# Patient Record
Sex: FEMALE | URBAN - METROPOLITAN AREA
[De-identification: names, ages, dates, MRNs, and addresses within clinical notes are randomized per-mention and may not be internally consistent; named-entity substitution may affect disease eponyms.]

---

## 2022-07-22 NOTE — PROGRESS NOTES
Tony Ferrell MD   Bariatric & Advanced Laparoscopic Surgery & Endoscopy  [facility] [address] [facility]  [place]                              Office [phone] Fax [phone]        Date of visit: 7/27/2022      History and Physical    Patient: Jamie Bhagat MRN: [id number]     YOB: 1995  Age: 27 y.o. Sex: female              PCP: No primary care provider on file. Date:  7/27/2022      Jamie Bhagat  who presents to the [facility] for consideration of bariatric surgery. This is her initial consultation preparing her for our multi-disciplinary surgical preparatory regimen. She presents with a height of 5' 7\" (1.702 m) and weight of 259 lb (117.5 kg), giving her a Body mass index is 40.57 kg/m². She has an ideal body weight of 159 lbs, and excess body weight of 100 lbs. BARIATRIC PROCEDURE OF INTEREST: laparoscopic sleeve gastrectomy    30 day Bariatric Surgical Risk Percentage: 3.87, 8.27%    MEDICAL HISTORY:  Morbid Obesity  Anxiety  Hyperthyroidism  Grave's Disease - well controlled - Dr. Marii José, [place]    Comorbidity Yes or No   Hypertension No   Hyperlipidemia No   Diabetes Mellitus No   Coronary Artery Disease No   Gastroesophageal Reflux No   Obstructive Sleep Apnea No   Cancer No   Asthma No   Osteoarthritis No   Joint Pain Yes     Admits to rare GERD symptoms including heartburn with certain foods. Denies medication, previous EGD, or dysphagia. Other Yes or No   Venous Stasis No   Dialysis No   Long term use of steroid or immunocompromised conditions No   On Anticoagulants No       DENTAL/CHEWING ABILITY:  No dental issues with chewing. PRIOR WEIGHT LOSS ATTEMPTS:  Reports more than 15 previous weight loss attempts including Weight Watchers, Atkins, Herbalife, and low calorie dieting with a nutritionist.     EXERCISE ASSESSMENT:  Reports exercise including walking / jogging 3 days per week. Reviewed NIH guidelines. PSYCHOSOCIAL:  She notes she is single and states main support person is her mother, Destiny Palacio. She is employed as a  at [employer]. Her goal in pursuing surgical weight loss is to improve health. She denies any active psychological problems and reports appropriate treatment of anxiety. She states she is independent in her care, can drive a car, and can ambulate without assistance. HISTORY:  Past Medical History:   Diagnosis Date    Graves disease        She denies personal or family history of problems with anesthesia, bleeding, or clotting. She denies history of DVT or pulmonary embolism. She denies dysphagia. Past Surgical History:   Procedure Laterality Date    MEDIAL COLLATERAL LIGAMENT REPAIR, KNEE Right 12/07/2007        Family History   Problem Relation Age of Onset    Diabetes Maternal Grandmother         MEDICATIONS:  No current outpatient medications on file. No current facility-administered medications for this visit. ALLERGIES:  Not on File    ROS: The patient has no difficulty with chest pain or shortness of breath. No fever or chills. Comprehensive 13 point review of systems was otherwise unremarkable except as noted above. Physical Exam:     /85   Pulse 85   Ht 5' 7\" (1.702 m)   Wt 259 lb (117.5 kg)   BMI 40.57 kg/m²     General:  Well-developed, well-nourished, no distress. Psych:  Cooperative, good insight and judgement. Neuro:  Alert, oriented to person, place and time. HEENT:  Normocephalic, atraumatic. Sclera clear. Lungs:  Unlabored breathing. Symmetrical chest expansion. Chest wall:  No tenderness or deformity. Heart:  Regular rate and rhythm. No JVD. Abdomen:  Soft, non-tender, non-distended. No guarding or rebound. No palpable masses. Extremities:  Extremities normal, atraumatic, no cyanosis or edema. Skin:  Skin color, texture, turgor normal. No rashes.     ASSESSMENT:  Morbid obesity with a Body mass index is 40.57 kg/m². beginning multi-disciplinary surgical prep program.    PLAN:  We have discussed the patient's participation and reviewed the steps in our preparatory program. She has had a long history of failed diet and exercise programs and has good understanding about the risks, benefits, and commitment related to bariatric surgery. She has attended our comprehensive educational seminar. She is interested in proceeding with our program seeking the laparoscopic sleeve gastrectomy. Diagnosis Orders   1. Morbid obesity ([address]        2. Obesity, Class III, BMI 40-49.9 (morbid obesity) ([address]        3. Hx of gastroesophageal reflux (GERD)        4. Encounter for pre-operative laboratory testing        5. Hyperthyroidism        6. Graves disease            1. Discussed in detail the Laparoscopic Beau-en-Y Gastric Bypass and the Laparoscopic Sleeve Gastrectomy including the benefits, risks, and complications of each operation. The patient has a clear understanding of all operations. Also, discussed perioperative care and in-hospital and home care after each operation. The patient verbalized and demonstrated a clear understanding of what to expect. 2.  Patient is an excellent candidate with a clear medical necessity for bariatric surgery. 3.  Encouraged patient to participate in our Bariatric Support Group. 4.  Advised that weight loss surgery is only one part of a lifelong weight management program, and that sustainable weight loss will only come with major diet and behavioral changes. Advised that weight loss surgery requires a commitment to self-management and long-term follow up. 5.  Nutrition Recommendations:   Diet Rx - Low-moderate calorie intake (~1637-2410 kcal/day, based on 14-16kcal/kg ABW). Work on eating at least 3x/d with lean protein focus.  2 week pre-operative diet with caloric restriction of ~1200kcal/d.     6.  Exercise Recommendations: Exercise routine, incorporating both cardio and strength training, building up to 5x/wk for at least 30 minutes. Physical therapy evaluation for guidance on exercise routine. 7.  The assessment and plan of care were reviewed in detail with the patient and her questions were answered. She will complete the following steps:  1. Complete bariatric labs after dietitian evaluation. 2.  Participation in our behavior modification program, reduced calorie diet program, and participation in our exercise program recommendations supervised by our office. 3.  Complete our required psychological evaluation. 4.  Reminded of policy of no weight gain during prep period. 5.  Upper GI ordered  6. Physical Therapy Evaluation      She will return after the above are complete for assessment of her progress and schedule surgery. Time: I spent 60 minutes preparing to see patient (including chart review and preparation), obtaining and/or reviewing additional medical history, performing a physical exam and evaluation, documenting clinical information in the electronic health record, independently interpreting results, communicating results to patient, family or caregiver, and/or coordinating care.         Signed: Bong Graves MD  Bariatric & Minimally Invasive Surgery  7/27/2022

## 2022-07-27 ENCOUNTER — OFFICE VISIT (OUTPATIENT)
Dept: SURGERY | Age: 27
End: 2022-07-27
Payer: COMMERCIAL

## 2022-07-27 VITALS
BODY MASS INDEX: 40.65 KG/M2 | HEIGHT: 67 IN | DIASTOLIC BLOOD PRESSURE: 85 MMHG | WEIGHT: 259 LBS | SYSTOLIC BLOOD PRESSURE: 125 MMHG | HEART RATE: 85 BPM

## 2022-07-27 DIAGNOSIS — Z87.19 HX OF GASTROESOPHAGEAL REFLUX (GERD): ICD-10-CM

## 2022-07-27 DIAGNOSIS — E05.90 HYPERTHYROIDISM: ICD-10-CM

## 2022-07-27 DIAGNOSIS — Z01.812 ENCOUNTER FOR PRE-OPERATIVE LABORATORY TESTING: ICD-10-CM

## 2022-07-27 DIAGNOSIS — E66.01 OBESITY, CLASS III, BMI 40-49.9 (MORBID OBESITY) (HCC): ICD-10-CM

## 2022-07-27 DIAGNOSIS — E05.00 GRAVES DISEASE: ICD-10-CM

## 2022-07-27 DIAGNOSIS — E66.01 MORBID OBESITY (HCC): Primary | ICD-10-CM

## 2022-07-27 PROCEDURE — APPSS45 APP SPLIT SHARED TIME 31-45 MINUTES: Performed by: PHYSICIAN ASSISTANT

## 2022-07-27 PROCEDURE — 99205 OFFICE O/P NEW HI 60 MIN: CPT | Performed by: SURGERY

## 2022-08-03 NOTE — PROGRESS NOTES
Susan Briones PA-C  Bariatric & Advanced Laparoscopic Surgery & Endoscopy  [address] [place] [address]  [place]  Phone [phone]   Fax [phone]    Date of visit: 2022      Primary/Requesting provider: No primary care provider on file. Name: Deirdre Street      MRN: [id number]       : 1995       Age: 32 y.o. Sex: female        PCP: No primary care provider on file. CC:  Bariatric monthly dietary follow up    Surgeon: Dr. Jasson James    Procedure: laparoscopic sleeve gastrectomy    Surgical Consult Date: 2022    The patient is a 32 y.o. female presenting with a height of 5' 7\" (1.702 m) and weight of 257 lb (116.6 kg), giving her a Body mass index is 40.25 kg/m². She has an ideal body weight of 159 lbs, and excess body weight of 98 lbs. She started our program with a weight of 259 lbs, lost 2lbs. She is in the process of completing all aspects of our prep program and to be deemed an acceptable candidate for bariatric surgery. Patient has a long term history of obesity with multiple failed attempts at weight reduction. Patient denies any changes in health history or physical health since last visit. Patient has been adherent to her diet and exercise regimen. Patient feels that she is well informed regarding her bariatric surgery choice and would like to proceed with a laparoscopic sleeve gastrectomy for weight reduction, improvement of her medical conditions, and improved quality of life. Patient verbalized understanding of the risks associated with bariatric surgery. Patient also verbalized understanding that bariatric surgery is a tool and that weight reduction is dependent on behavioral changes in regards to what she eats and how much.     NUTRITION ASSESSMENT:   Diet Recall:   Breakfast: 2 boiled eggs  Lunch: 6-in ch turkey sub, jalapeno chips  Dinner: spaghetti with meat sauce, veggies  Beverages: water  Habits:   Eating 3x/day: working on this  Elimination of caffeine and carbonated beverages: working on this  Practicing not drinking with meals: working on this     EXERCISE ASSESSMENT: Pt is currently exercising via walking 3-4x for 45-60 minutes. NIH Guidelines reviewed with pt. PSYCHOLOGICAL EVALUATION:  Scheduled, 09/19/2022  DIETITIAN EVALUATION:  In progress with Marilyn Condon RD, GENIE  BARIATRIC LABS:  Not completed  UPPER GI:  Completed, 08/19/2022  PHYSICAL THERAPY EVALUATION FOR MOBILITY OPTIMIZATION:  Completed, 08/19/2022    MEDICAL HISTORY:  Morbid Obesity  Anxiety  Hyperthyroidism  Grave's Disease - well controlled - CITLALI Singh     Comorbidity Yes or No   Hypertension No   Hyperlipidemia No   Diabetes Mellitus No   Coronary Artery Disease No   Gastroesophageal Reflux No   Obstructive Sleep Apnea No   Cancer No   Asthma No   Osteoarthritis No   Joint Pain Yes      Admits to rare GERD symptoms including heartburn with certain foods. Denies medication, previous EGD, or dysphagia. Other Yes or No   Venous Stasis No   Dialysis No   Long term use of steroid or immunocompromised conditions No   On Anticoagulants No      PMH:  Past Medical History:   Diagnosis Date    Graves disease        PSH:  Past Surgical History:   Procedure Laterality Date    MEDIAL COLLATERAL LIGAMENT REPAIR, KNEE Right 12/07/2007       MEDS:  No current outpatient medications on file. No current facility-administered medications for this visit. ALLERGIES:    No Known Allergies    SH:       FH:  Family History   Problem Relation Age of Onset    Diabetes Maternal Grandmother           Physical Exam:     /84   Pulse 90   Ht 5' 7\" (1.702 m)   Wt 257 lb (116.6 kg)   BMI 40.25 kg/m²     General:  Well-developed, well-nourished, no distress. Psych:  Cooperative, good insight and judgement. Neuro:  Alert, oriented to person, place and time. Lungs:  Unlabored breathing. Symmetrical chest expansion.    Heart:  Regular rate and rhythm. Abdomen:  Soft, obese, non-tender, non-distended. No guarding or rebound. No palpable masses. Labs: All recent labs were reviewed. No results found for: LABA1C  No results found for: TSHFT4, TSH   No results found for: VITD25   No results found for: [id number]   No results found for: IRON, TIBC, FERRITIN     Imaging: All images were independently reviewed by me. Diagnosis Orders   1. Morbid obesity ([address]        2. Obesity, Class III, BMI 40-49.9 (morbid obesity) ([address]        3. Hx of gastroesophageal reflux (GERD)        4. Hyperthyroidism        5. Graves disease        6. Dietary counseling        7. Exercise counseling            Assessment/Plan:    Jalen Childress is a 32 y.o. female is here for monthly follow-up visit prior to bariatric surgery with medical co-morbidities related to morbid obesity. Patient is a good candidate for bariatric surgery and meets NIH criteria for weight loss surgery. In detail, discussed risks and benefits of laparoscopic sleeve gastrectomy. Reviewed information and expectations regarding the pre-operative period, the bariatric procedure, and postoperative period. Stressed with patient importance of understanding bariatric surgery is a tool and will not work if patient does not continue with healthy lifestyle changes including regular exercise and high protein, low calorie, low carb diet. Patient was seen by the dietitian today for continued evaluation and management regarding lifestyle and dietary changes. Reviewed assessment and plan in detail. Patient verbalized understanding. Questions answered. Recommendations: Continue to work on dietary changes over the next couple of weeks, follow up in one month. Complete remaining requirements including psychology appointments and blood work.     Moise Grant PA-C  Bariatric Surgery  8/19/2022    Counseling time:counseling time more than 50% of visit: 45 minutes: I spent this time preparing to see patient (including chart review and preparation), obtaining and/or reviewing additional medical history, performing a physical exam and evaluation, documenting clinical information in the electronic health record, independently interpreting results, communicating results to patient, family or caregiver, and/or coordinating care.

## 2022-08-19 ENCOUNTER — HOSPITAL ENCOUNTER (OUTPATIENT)
Dept: PHYSICAL THERAPY | Age: 27
Setting detail: RECURRING SERIES
Discharge: HOME | End: 2022-08-22
Payer: COMMERCIAL

## 2022-08-19 ENCOUNTER — OFFICE VISIT (OUTPATIENT)
Dept: SURGERY | Age: 27
End: 2022-08-19
Payer: COMMERCIAL

## 2022-08-19 ENCOUNTER — HOSPITAL ENCOUNTER (OUTPATIENT)
Dept: GENERAL RADIOLOGY | Age: 27
Discharge: HOME | End: 2022-08-22
Payer: COMMERCIAL

## 2022-08-19 VITALS
BODY MASS INDEX: 40.34 KG/M2 | HEIGHT: 67 IN | SYSTOLIC BLOOD PRESSURE: 120 MMHG | HEART RATE: 90 BPM | WEIGHT: 257 LBS | DIASTOLIC BLOOD PRESSURE: 84 MMHG

## 2022-08-19 DIAGNOSIS — E05.90 HYPERTHYROIDISM: ICD-10-CM

## 2022-08-19 DIAGNOSIS — E66.01 OBESITY, CLASS III, BMI 40-49.9 (MORBID OBESITY) (HCC): ICD-10-CM

## 2022-08-19 DIAGNOSIS — Z71.82 EXERCISE COUNSELING: ICD-10-CM

## 2022-08-19 DIAGNOSIS — E05.00 GRAVES DISEASE: ICD-10-CM

## 2022-08-19 DIAGNOSIS — Z01.812 ENCOUNTER FOR PRE-OPERATIVE LABORATORY TESTING: ICD-10-CM

## 2022-08-19 DIAGNOSIS — E66.01 MORBID OBESITY (HCC): ICD-10-CM

## 2022-08-19 DIAGNOSIS — E66.01 MORBID OBESITY (HCC): Primary | ICD-10-CM

## 2022-08-19 DIAGNOSIS — Z87.19 HX OF GASTROESOPHAGEAL REFLUX (GERD): ICD-10-CM

## 2022-08-19 DIAGNOSIS — Z71.3 DIETARY COUNSELING: ICD-10-CM

## 2022-08-19 LAB
EST. AVERAGE GLUCOSE BLD GHB EST-MCNC: 117 MG/DL
HBA1C MFR BLD: 5.7 % (ref 4.8–5.6)

## 2022-08-19 PROCEDURE — 2500000003 HC RX 250 WO HCPCS: Performed by: PHYSICIAN ASSISTANT

## 2022-08-19 PROCEDURE — 97161 PT EVAL LOW COMPLEX 20 MIN: CPT

## 2022-08-19 PROCEDURE — A4641 RADIOPHARM DX AGENT NOC: HCPCS | Performed by: PHYSICIAN ASSISTANT

## 2022-08-19 PROCEDURE — 99215 OFFICE O/P EST HI 40 MIN: CPT | Performed by: PHYSICIAN ASSISTANT

## 2022-08-19 PROCEDURE — 6360000004 HC RX CONTRAST MEDICATION: Performed by: PHYSICIAN ASSISTANT

## 2022-08-19 PROCEDURE — 74220 X-RAY XM ESOPHAGUS 1CNTRST: CPT

## 2022-08-19 RX ORDER — METHIMAZOLE 10 MG/1
TABLET ORAL
COMMUNITY
Start: 2022-07-20

## 2022-08-19 RX ORDER — NORGESTIMATE AND ETHINYL ESTRADIOL 0.25-0.035
KIT ORAL
COMMUNITY
Start: 2022-08-07

## 2022-08-19 RX ADMIN — BARIUM SULFATE 100 ML: 0.6 SUSPENSION ORAL at 13:34

## 2022-08-19 RX ADMIN — BARIUM SULFATE 1 TABLET: 700 TABLET ORAL at 13:34

## 2022-08-19 RX ADMIN — BARIUM SULFATE 80 ML: 980 POWDER, FOR SUSPENSION ORAL at 13:34

## 2022-08-19 NOTE — PROGRESS NOTES
Dominic Shah, MS, RD, LD  Surgical Weight Loss Dietitian  [address] [facility]  [place]ena  Phone [phone]   Fax [phone]    [facility] INITIAL ASSESSMENT    Nutrition Assessment:  Anthropometrics:    Ht: 57, wt: 257#, 162% IBW, 40.25 BMI  Pt has lost 2 lbs since last visit. Labs:   No results found for: HBA1C, VITD25, BWVP43LY, FOLATE, IRON, FERRITIN, TSH       Macronutrient needs assessment   EER: 8934-5481 kcal/d (14-16 kcal/kg ABW)    MSJ x 1.3-500 = 2004 kcal/d (sedentary AF)   EPR: 61-92g pro (1.0-1.5 gm pro/kg IBW)   CHO: ~250 g CHO/d (50% EER, ~5-6 servings CHO/meal)   H2O: 1mL/kcal or per MD recommendations     Support:  Pt has told boyfriend, immediate family, friends - to be good support. Reminded pt about SWL support group and online support group. Motivation:  High. Onset of obesity: Adulthood   Potential triggers to weight gain: overeating, eating out of boredom/distraction, eating too fast   Hx of obesity in family members include maternal grandmother. History of Weight loss attempts, goal >10# weight loss:    Prior bariatric programs: none    Commercial programs: weightwatchers, mita roverto, slim fast, isogenix    Registered Dietitian visits: yes - performance-based for sports    Medical Weight Loss programs, including medications: none    Self-supervised diets and exercise: keto, paleo, low-carb, low-calorie   Pt has attempted weight loss via modalities listed above - ~50+ attempts made, all without any permanent weight loss. Pt goal in pursuing SWL is improve health, better sleep, decreased risks for comorbidities, improved fertility.       Eating Habits:   Eating occasions/d: 2  Main cook at home: pt  Restaurant/Fast Food Intake: 4-5x/week  Food Allergies: none  Cultural Preferences: none  Typical Beverage Consumption: water, carbonated water  Diet Recall:   Breakfast: 2 boiled eggs  Lunch: 6-in ch turkey sub, jalapeno chips  Dinner: spaghetti Reason contacted:  Orders request  Information relayed:  Notified ok for requested orders per Dr. Hdz.   Additional questions:  No  Further follow-up needed:  No  Okay to leave a detailed message:  No     with meat sauce, veggies  Beverages: water,   Feeling after eating meals: Stuffed     Lifestyle Assessment:    Hours of sleep/night: ~8-9   Current Occupation:    Activity during day: light   Type of Housing: apartment    Number of people living in house and influence: 2, including pt and boyfriend - to be excellent influence. Exercise Assessment:   PAR-Q: none  Medical:     Pain or injuries (present): none    Past surgeries related to mobility: R knee 2014/2007  Exercise equipment at home: none   Gym Membership: gym at apartment  Current Exercise Routine: walking for 45-60 minutes 3d/week  Assessment Exercise Goal: walking and weight resistance  for 60 minutes 5d/week    Nutrition Diagnosis:  Morbid obesity R/T excessive energy intake and yoyo dieting as evidenced by BMI = 40.25 and 162 % IBW. Nutrition Intervention:   Diet Rx - Low-moderate calorie intake (~2000 kcal/day). Work on eating 3 meals/d and meal balance. Modify distribution, type or amount of food and nutrients within meals or at a specified time-    Discussed need for 3 meals per day and snacks based on body size. Explained macronutrients and emphasized mindful eating behaviors. Discussed meal priority and encouraged practice. Discussed effect of carbonated beverages on the body and the need for eventual elimination. Pt goal to increasing hydrating fluid intake. Educated on protein counting, meal priority, and nutrition supplement needs and encouraged practice. *Pt verbalizes understanding of information provided during this session. Nutrition Monitoring and Evaluation:   Monitor for understanding of diet and exercise rx. Follow up for practicing mindful eating behaviors and meal priority. Follow for elimination of carbonated beverages.    F/U 9/19/2022      Flavio Singh, MS, RD, LD

## 2022-08-19 NOTE — THERAPY EVALUATION
Livier Nieves  : 1995  Primary: Sallie Vogt  Secondary:  Kali Garcia @ [address] [place] [address] Way [address]  Phone: [phone]  Fax: [phone] Plan Frequency: 1xweek x 1 week  Plan of Care/Certification Expiration Date: 22    PT Visit Info: Total # of Visits to Date: 1    Visit Count:  1    OUTPATIENT PHYSICAL THERAPY:OP NOTE TYPE: Initial Assessment 2022               Episode  Appt Desk         Treatment Diagnosis:  Generalized Muscle Weakness (M62.81)  Medical/Referring Diagnosis:  Morbid (severe) obesity due to excess calories [E66.01]  Morbid (severe) obesity due to excess calories [E66.01]  Personal history of other diseases of the digestive system [Z87.19]  Encounter for preprocedural laboratory examination [Z01.812]  Thyrotoxicosis, unspecified without thyrotoxic crisis or storm [E05.90]  Thyrotoxicosis with diffuse goiter without thyrotoxic crisis or storm [E05.00]  Referring Physician:  Mechelle Barrios MD Orders:  PT Eval and Treat   Return MD Appt:  tbd  Date of Onset:  Onset Date: 22   Allergies:  Patient has no known allergies. Restrictions/Precautions:    Restrictions/Precautions: NoneNo data recorded   Medications Last Reviewed:  2022     SUBJECTIVE   History of Injury/Illness (Reason for Referral):  Pt states she is preparing for bariatric surgery. She has exercised in the past but is not currently exercising. Patient Stated Goal(s):  \"be prepared for bariatric surgery\"  Initial:  0    /10 Post Session:     0 /10  Past Medical History/Comorbidities:   Ms. Mariusz Guthrie  has a past medical history of Graves disease. Ms. Mariusz Guthrie  has a past surgical history that includes Medial collateral ligament repair, knee (Right, 2007).   Social History/Living Environment:   Lives With: Significant other   Prior Level of Function/Work/Activity:   Prior level of function: IndependentNo data recordedNo data recorded   Learning: represents no disability. Minimal detectable change is 9 points. Medical Necessity:   > Skilled intervention continues to be required due to knowledge deficit HEP for bariatric surgery. Reason For Services/Other Comments: Total Duration: 40 minutes        Regarding George Flank therapy, I certify that the treatment plan above will be carried out by a therapist or under their direction.   Thank you for this referral,  Linda Méndez, PT     Referring Physician Signature: Serene Godfrey _______________________________ Date _____________        Post Session Pain  Charge Capture  PT Visit Info MD Guidelines  MyChart

## 2022-08-20 LAB
25(OH)D3 SERPL-MCNC: 17 NG/ML (ref 30–100)
FERRITIN SERPL-MCNC: 27 NG/ML (ref 8–388)
FOLATE SERPL-MCNC: 18.1 NG/ML (ref 3.1–17.5)
IRON SERPL-MCNC: 94 UG/DL (ref 35–150)
TSH, 3RD GENERATION: 1.21 UIU/ML (ref 0.36–3.74)
VIT B12 SERPL-MCNC: 328 PG/ML (ref 193–986)

## 2022-08-26 LAB
COTININE SERPL-MCNC: <1 NG/ML
NICOTINE SERPL-MCNC: <1 NG/ML

## 2022-09-14 NOTE — PROGRESS NOTES
Melissa Briones, MS, RD, LD  Surgical Weight Loss Dietitian  [address] [facility]  [place]  Phone [phone]   Fax [phone]    Anthropometrics:Ht: 09, wt: 259#, 163% IBW, 40.57 BMI    Labs:   Vit D, 25-Hydroxy (ng/mL)   Date Value   08/19/2022 17.0 (L)     Folate (ng/mL)   Date Value   08/19/2022 18.1 (H)     Iron (ug/dL)   Date Value   08/19/2022 94     Ferritin (NG/ML)   Date Value   08/19/2022 27      Pt is not currently taking a Vitamin D supplement per pt. Referred to provider for recommendation. Evaluation:  BW: 259# (+2#, visit 3 of supervised weight loss program)   Pt reports good dietary compliance over the past month. She is consuming 3 meals/d. Pt is exercising via walking 3-4d/wk for 60 minutes. Pt reports has read through program manual.  Diet Recall:  Breakfast: scrambled eggs and avocado  Lunch: 1/2 poke bowl - salmon, shrimp, sushi rice, cucumber, avocado  Dinner: 1/2 poke bowl - salmon, shrimp, sushi rice, cucumber, avocado  Drinks: water, crystal light  Habits:   Eating mindfully: yes   Drinking after meals: yes, currently waiting 5-10 minutes   Elimination of sugary/carbonated/caffeinated/alcoholic beverages: yes   Drinking without straws: yes    Nutrition Diagnosis:  Morbid obesity R/T excessive energy intake as evidenced by BMI = 40.57 and 163 % IBW. Nutrition Intervention:   Diet Rx - Low-moderate calorie intake (~2000 kcal/day). Work on eating 3 meals/d and meal balance. Modify distribution, type or amount of food and nutrients within meals or at a specified time-      Encouraged elimination of sugary and carbonated beverages  Encouraged avoidance of simple carbohydrates  Encouraged lean protein focus  Encouraged practice with meal priority; protein first followed by non-starchy vegetables and complex carbohydrates  Discussed mindful eating behaviors and encouraged practice.   Encouraged avoidance of fried foods.  Encouraged reduction in fast/convenience foods. Discussed need for 3 meals per day and snacks based on body size. Explained macronutrients and emphasized mindful eating behaviors. Discussed meal priority and encouraged practice. Lean protein first, followed by non-starchy vegetables and complex carbohydrates  Pt goal to continue eating 3 times daily, with meal priority and mindful eating behaviors. B. Fluid Requirements: > 64 oz/day - non-carbonated, non-caffeinated, non-alcoholic, sugar free, avoid using straws   Pt goal to continue hydrating fluid intake, and practice waiting to drink after meals to goal of 30 mins. C. Exercise Recommendations: 300 min/wk both cardio and resistance training  Pt goal to exercise 3-4x/wk for 60 minutes via walking and jogging. *Pt verbalizes understanding of information provided during this session. Nutrition Monitoring and Evaluation:   Monitor for understanding of diet and exercise rx. Monitor for consumption of lean protein intake. Follow for proper calculation of protein consumption. Monitor for improved hydrating fluid intake. Follow up for practicing mindful eating behaviors and meal priority. Follow for increased activity. F/U per MD    Impression:      Readiness to learn and change: Moderate   Comprehension level: High   Anticipated compliance: Moderate     Good SWL candidate at this time. Pt has made good changes during supervised diet with RD. RD feels as though pt will be able to adhere to post-operative instructions and plan of care. Pt understands the habits that must be in place to support the SWL tool. I look forward to continuing to work with pt.     Antoni Del Angel, MS, RD, LD

## 2022-09-16 NOTE — PROGRESS NOTES
Tila Knapp PA-C  Bariatric & Advanced Laparoscopic Surgery & Endoscopy  [facility] [address]  [place]iann Cadena  Phone [phone]   Fax [phone]    Date of visit: 2022      Primary/Requesting provider: None Provider         Name: Martha Oglesby      MRN: [id number]       : 1995       Age: 32 y.o. Sex: female        PCP: None Provider     CC:  Bariatric monthly dietary follow up    Surgeon: Dr. Arielle Leonardo    Procedure: laparoscopic sleeve gastrectomy    Surgical Consult Date: 2022    The patient is a 32 y.o. female presenting with a height of 5' 7\" (1.702 m) and weight of 259 lb (117.5 kg), giving her a Body mass index is 40.57 kg/m². She has an ideal body weight of 159 lbs, and excess body weight of 100 lbs. She started our program with a weight of 259 lbs, remained weight stable. She is in the process of completing all aspects of our prep program and to be deemed an acceptable candidate for bariatric surgery. Patient has a long term history of obesity with multiple failed attempts at weight reduction. Patient denies any changes in health history or physical health since last visit. Patient has been adherent to her diet and exercise regimen. Patient feels that she is well informed regarding her bariatric surgery choice and would like to proceed with a laparoscopic sleeve gastrectomy for weight reduction, improvement of her medical conditions, and improved quality of life. Patient verbalized understanding of the risks associated with bariatric surgery. Patient also verbalized understanding that bariatric surgery is a tool and that weight reduction is dependent on behavioral changes in regards to what she eats and how much.     NUTRITION ASSESSMENT:   Diet Recall:   Breakfast: scrambled eggs and avocado  Lunch: 1/2 poke bowl - salmon, shrimp, sushi rice, cucumber, avocado  Dinner: 1/2 poke bowl - salmon, shrimp, sushi rice, cucumber, avocado  Drinks: water, crystal light  Habits:   Eating 3x/day: yes  Elimination of caffeine and carbonated beverages: yes  Practicing not drinking with meals: yes     EXERCISE ASSESSMENT: Pt is currently exercising via walking 3-4d/wk for 60 minutes. NIH Guidelines reviewed with pt. PSYCHOLOGICAL EVALUATION:  In progress, 09/19/2022 with Deandra Ornelas. Pended, scheduled with Shade Baum with [facility] for Wednesday and following up with John Bhat on 09/30/2022. DIETITIAN EVALUATION:  In progress with Kristina العراقي RD, LD  BARIATRIC LABS:  Completed, 08/19/2022 (A1C 5.7, vitamin D 17.0)  UPPER GI:  Completed, 08/19/2022  Unremarkable upper GI.  PHYSICAL THERAPY EVALUATION FOR MOBILITY OPTIMIZATION:  Completed, 08/19/2022     MEDICAL HISTORY:  Morbid Obesity  Anxiety  Hyperthyroidism  Grave's Disease - well controlled - Dr. Pau Cervantes, [place]     Comorbidity Yes or No   Hypertension No   Hyperlipidemia No   Diabetes Mellitus No   Coronary Artery Disease No   Gastroesophageal Reflux No   Obstructive Sleep Apnea No   Cancer No   Asthma No   Osteoarthritis No   Joint Pain Yes      Admits to rare GERD symptoms including heartburn with certain foods. Denies medication, previous EGD, or dysphagia. Other Yes or No   Venous Stasis No   Dialysis No   Long term use of steroid or immunocompromised conditions No   On Anticoagulants No      PMH:  Past Medical History:   Diagnosis Date    Graves disease        PSH:  Past Surgical History:   Procedure Laterality Date    MEDIAL COLLATERAL LIGAMENT REPAIR, KNEE Right 12/07/2007       MEDS:  Current Outpatient Medications   Medication Sig Dispense Refill    methIMAzole (TAPAZOLE) 10 MG tablet       norgestimate-ethinyl estradiol (ORTHO-CYCLEN) 0.25-35 MG-MCG per tablet        No current facility-administered medications for this visit.        ALLERGIES:    No Known Allergies    SH:       FH:  Family History   Problem Relation Age of Onset    Diabetes Maternal Grandmother Physical Exam:     /89   Pulse 85   Ht 5' 7\" (1.702 m)   Wt 259 lb (117.5 kg)   BMI 40.57 kg/m²     General:  Well-developed, well-nourished, no distress. Psych:  Cooperative, good insight and judgement. Neuro:  Alert, oriented to person, place and time. Lungs:  Unlabored breathing. Symmetrical chest expansion. Heart:  Regular rate and rhythm. Abdomen:  Soft, obese, non-tender, non-distended. No guarding or rebound. No palpable masses. Labs: All recent labs were reviewed. Lab Results   Component Value Date    LABA1C 5.7 (H) 08/19/2022     No results found for: TSHFT4, TSH   Lab Results   Component Value Date    VITD25 17.0 (L) 08/19/2022      Lab Results   Component Value Date    RHKNZEUK45 328 08/19/2022      Lab Results   Component Value Date    IRON 94 08/19/2022    FERRITIN 27 08/19/2022        Imaging: All images were independently reviewed by me. Diagnosis Orders   1. Morbid obesity ([address]        2. Obesity, Class III, BMI 40-49.9 (morbid obesity) ([address]        3. Hx of gastroesophageal reflux (GERD)        4. Hyperthyroidism        5. Graves disease        6. Dietary counseling        7. Exercise counseling            Assessment/Plan:    Angeles Benz is a 32 y.o. female is here for monthly follow-up visit prior to bariatric surgery with medical co-morbidities related to morbid obesity. Patient is a good candidate for bariatric surgery and meets NIH criteria for weight loss surgery. In detail, discussed risks and benefits of laparoscopic sleeve gastrectomy. Reviewed information and expectations regarding the pre-operative period, the bariatric procedure, and postoperative period. Stressed with patient importance of understanding bariatric surgery is a tool and will not work if patient does not continue with healthy lifestyle changes including regular exercise and high protein, low calorie, low carb diet.  Patient was seen by the dietitian today for continued evaluation and management regarding lifestyle and dietary changes. Reviewed assessment and plan in detail. Patient verbalized understanding. Questions answered. Recommendations: Continue to work on dietary changes over the next couple of weeks. Patient is currently pended by Heather Gruber with concern for B.E.D. and is requested to follow with a counselor. The patient is scheduled for initial intake on Wednesday 09/21/2022 with Zahraa Smith at [facility], and will follow up with Heather Gruber on 09/30/2022. From my standpoint, the patient is motivated and is appropriately managing this work up period, so she may contact the office to schedule her pre-operative appointment for after follow up with Estrella Yoo if she is cleared from a psychological standpoint. Gareth Sood PA-C  Bariatric Surgery  9/19/2022    Counseling time:counseling time more than 50% of visit: 30 minutes: I spent this time preparing to see patient (including chart review and preparation), obtaining and/or reviewing additional medical history, performing a physical exam and evaluation, documenting clinical information in the electronic health record, independently interpreting results, communicating results to patient, family or caregiver, and/or coordinating care.

## 2022-09-19 ENCOUNTER — OFFICE VISIT (OUTPATIENT)
Dept: BEHAVIORAL/MENTAL HEALTH CLINIC | Facility: CLINIC | Age: 27
End: 2022-09-19

## 2022-09-19 ENCOUNTER — OFFICE VISIT (OUTPATIENT)
Dept: SURGERY | Age: 27
End: 2022-09-19
Payer: COMMERCIAL

## 2022-09-19 VITALS
WEIGHT: 259 LBS | DIASTOLIC BLOOD PRESSURE: 89 MMHG | HEIGHT: 67 IN | BODY MASS INDEX: 40.65 KG/M2 | HEART RATE: 85 BPM | SYSTOLIC BLOOD PRESSURE: 132 MMHG

## 2022-09-19 DIAGNOSIS — F54 PSYCHOLOGICAL AND BEHAVIORAL FACTORS ASSOCIATED WITH DISORDERS OR DISEASES CLASSIFIED ELSEWHERE: Primary | ICD-10-CM

## 2022-09-19 DIAGNOSIS — E05.90 HYPERTHYROIDISM: ICD-10-CM

## 2022-09-19 DIAGNOSIS — Z71.3 DIETARY COUNSELING: ICD-10-CM

## 2022-09-19 DIAGNOSIS — F50.81 BINGE EATING DISORDER: ICD-10-CM

## 2022-09-19 DIAGNOSIS — Z71.82 EXERCISE COUNSELING: ICD-10-CM

## 2022-09-19 DIAGNOSIS — E05.00 GRAVES DISEASE: ICD-10-CM

## 2022-09-19 DIAGNOSIS — E66.01 MORBID OBESITY (HCC): Primary | ICD-10-CM

## 2022-09-19 DIAGNOSIS — E66.01 OBESITY, MORBID, BMI 40.0-49.9 (HCC): ICD-10-CM

## 2022-09-19 DIAGNOSIS — Z87.19 HX OF GASTROESOPHAGEAL REFLUX (GERD): ICD-10-CM

## 2022-09-19 DIAGNOSIS — E66.01 OBESITY, CLASS III, BMI 40-49.9 (MORBID OBESITY) (HCC): ICD-10-CM

## 2022-09-19 PROCEDURE — 99214 OFFICE O/P EST MOD 30 MIN: CPT | Performed by: PHYSICIAN ASSISTANT
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
## 2022-09-20 ASSESSMENT — ANXIETY QUESTIONNAIRES
5. BEING SO RESTLESS THAT IT IS HARD TO SIT STILL: 0
GAD7 TOTAL SCORE: 2
3. WORRYING TOO MUCH ABOUT DIFFERENT THINGS: 0
4. TROUBLE RELAXING: 0
6. BECOMING EASILY ANNOYED OR IRRITABLE: 0
7. FEELING AFRAID AS IF SOMETHING AWFUL MIGHT HAPPEN: 0
2. NOT BEING ABLE TO STOP OR CONTROL WORRYING: 1
1. FEELING NERVOUS, ANXIOUS, OR ON EDGE: 1

## 2022-09-20 ASSESSMENT — PATIENT HEALTH QUESTIONNAIRE - PHQ9
4. FEELING TIRED OR HAVING LITTLE ENERGY: 1
SUM OF ALL RESPONSES TO PHQ QUESTIONS 1-9: 2
9. THOUGHTS THAT YOU WOULD BE BETTER OFF DEAD, OR OF HURTING YOURSELF: 0
6. FEELING BAD ABOUT YOURSELF - OR THAT YOU ARE A FAILURE OR HAVE LET YOURSELF OR YOUR FAMILY DOWN: 1
2. FEELING DOWN, DEPRESSED OR HOPELESS: 0
SUM OF ALL RESPONSES TO PHQ QUESTIONS 1-9: 2
1. LITTLE INTEREST OR PLEASURE IN DOING THINGS: 0
SUM OF ALL RESPONSES TO PHQ9 QUESTIONS 1 & 2: 0
7. TROUBLE CONCENTRATING ON THINGS, SUCH AS READING THE NEWSPAPER OR WATCHING TELEVISION: 0
SUM OF ALL RESPONSES TO PHQ QUESTIONS 1-9: 2
3. TROUBLE FALLING OR STAYING ASLEEP: 0
8. MOVING OR SPEAKING SO SLOWLY THAT OTHER PEOPLE COULD HAVE NOTICED. OR THE OPPOSITE, BEING SO FIGETY OR RESTLESS THAT YOU HAVE BEEN MOVING AROUND A LOT MORE THAN USUAL: 0
SUM OF ALL RESPONSES TO PHQ QUESTIONS 1-9: 2
10. IF YOU CHECKED OFF ANY PROBLEMS, HOW DIFFICULT HAVE THESE PROBLEMS MADE IT FOR YOU TO DO YOUR WORK, TAKE CARE OF THINGS AT HOME, OR GET ALONG WITH OTHER PEOPLE: 1
5. POOR APPETITE OR OVEREATING: 0

## 2022-09-20 ASSESSMENT — LIFESTYLE VARIABLES
HOW OFTEN DO YOU HAVE A DRINK CONTAINING ALCOHOL: 2-4 TIMES A MONTH
HOW MANY STANDARD DRINKS CONTAINING ALCOHOL DO YOU HAVE ON A TYPICAL DAY: 1 OR 2

## 2022-09-29 NOTE — PROGRESS NOTES
Mirella Bran MD   Bariatric & Advanced Laparoscopic Surgery & Endoscopy  [address] [place] [address], [address]  Phone [phone]   Fax [phone]      Date of visit: 10/4/2022      Primary/Requesting provider: None Provider         Name: Peri Greenberg      MRN: [id number]       : 1995       Age: 32 y.o. Sex: female        PCP: None Provider     CC:    Chief Complaint   Patient presents with    Follow-up     BAR Pre-op Sleeve          Procedure: laparoscopic sleeve gastrectomy    Surgical Consult Date: 2022    Surgical Date: TBD     The patient is a 32 y.o. female presenting with a height of 5' 7\" (1.702 m) and weight of 263 lb (119.3 kg), giving her a Body mass index is 41.19 kg/m². She has an ideal body weight of 159 lbs, and excess body weight of 104 lbs. She started our program with a weight of 259 lbs, gaining 4 lbs. She has completed all aspects of our prep program and has been deemed an acceptable candidate for bariatric surgery. 30-Day Bariatric Surgical Risk Percentage: 3.87%    PSYCHOLOGICAL EVALUATION:   Completed, 2022 with Ermelinda Ling deeming her an appropriate surgical candidate. DIETITIAN EVALUATION:  Completed with Shahla Kidd deeming her an appropriate surgical candidate. BARIATRIC LABS:  Completed, 2022 (A1C 5.7, vitamin D 17.0)    UPPER GI:  Completed, 2022  Unremarkable upper GI.    PHYSICAL THERAPY EVALUATION FOR MOBILITY OPTIMIZATION:  Completed, 2022    We have reviewed the procedure again today and completed our standard pre op teaching. Her questions were answered and she is ready to schedule surgery. We have discussed the procedure, diet and exercise regimens, and potential complications of surgery. The patient understands the nature and potential complication of surgery and has the capacity to follow the post operative diet, exercise, and nutritional requirements.   After review, she has signed her surgical consent today. MEDICAL HISTORY:  Morbid Obesity  Anxiety  Hyperthyroidism  Grave's Disease - well controlled - Dr. Katheryn Laura, [facility]     Comorbidity Yes or No   Hypertension No   Hyperlipidemia No   Diabetes Mellitus No   Coronary Artery Disease No   Gastroesophageal Reflux No   Obstructive Sleep Apnea No   Cancer No   Asthma No   Osteoarthritis No   Joint Pain Yes      Admits to rare GERD symptoms including heartburn with certain foods. Denies medication, previous EGD, or dysphagia. Other Yes or No   Venous Stasis No   Dialysis No   Long term use of steroid or immunocompromised conditions No   On Anticoagulants No      DENTAL/CHEWING ABILITY:  No dental issues with chewing. PRIOR WEIGHT LOSS ATTEMPTS:  Reports more than 15 previous weight loss attempts including Weight Watchers, Atkins, Herbalife, and low calorie dieting with a nutritionist.      EXERCISE ASSESSMENT:  Reports exercise including walking / jogging 3 days per week. Reviewed NIH guidelines. PSYCHOSOCIAL:  She notes she is single and states main support person is her mother, Bernarda Noland. She is employed as a  at [employer]. Her goal in pursuing surgical weight loss is to improve health. She denies any active psychological problems and reports appropriate treatment of anxiety. She states she is independent in her care, can drive a car, and can ambulate without assistance. Patient has a long term history of obesity with multiple failed attempts at weight reduction. Patient denies any changes in health history or physical health since last visit. Patient has been adherent to her diet and exercise regimen. Patient feels that she is well informed regarding her bariatric surgery choice and would like to proceed with a laparoscopic sleeve gastrectomy for weight reduction, improvement of her medical conditions, and improved quality of life.  Patient verbalized understanding of the risks associated with bariatric surgery. Patient also verbalized understanding that bariatric surgery is a tool and that weight reduction is dependent on behavioral changes in regards to what she eats and how much. PMH:    Past Medical History:   Diagnosis Date    Graves disease        PSH:    Past Surgical History:   Procedure Laterality Date    MEDIAL COLLATERAL LIGAMENT REPAIR, KNEE Right 12/07/2007       MEDS:    Current Outpatient Medications   Medication Sig Dispense Refill    sucralfate (CARAFATE) 1 GM tablet Take 1 tablet by mouth 4 times daily for 14 days Dissolve in water and drink. 56 tablet 0    omeprazole (PRILOSEC) 40 MG delayed release capsule Take 1 capsule by mouth every morning (before breakfast) 90 capsule 0    oxyCODONE-acetaminophen (PERCOCET) 5-325 MG per tablet Take 1 tablet by mouth every 6 hours as needed for Pain for up to 5 days. Intended supply: 5 days. Take lowest dose possible to manage pain 20 tablet 0    ondansetron (ZOFRAN) 4 MG tablet Take 1 tablet by mouth 3 times daily as needed for Nausea or Vomiting 30 tablet 0    methIMAzole (TAPAZOLE) 10 MG tablet       norgestimate-ethinyl estradiol (ORTHO-CYCLEN) 0.25-35 MG-MCG per tablet        No current facility-administered medications for this visit. ALLERGIES:      No Known Allergies    SH:         FH:    Family History   Problem Relation Age of Onset    Diabetes Maternal Grandmother           Physical Exam:     BP (!) 131/95   Pulse 81   Ht 5' 7\" (1.702 m)   Wt 263 lb (119.3 kg)   BMI 41.19 kg/m²     General:  Well-developed, well-nourished, no distress. Psych:  Cooperative, good insight and judgement. Neuro:  Alert, oriented to person, place and time. HEENT:  Normocephalic, atraumatic. Sclera clear. Lungs:  Unlabored breathing. Symmetrical chest expansion. Chest wall:  No tenderness or deformity. Heart:  Regular rate and rhythm. No JVD. Abdomen:  Soft, obese, non-tender, non-distended. No guarding or rebound.  No palpable masses. Extremities:  Extremities normal, atraumatic, no cyanosis or edema. Skin:  Skin color, texture, turgor normal. No rashes. Labs: All recent labs were reviewed. Imaging: All images were independently reviewed by me. Diagnosis Orders   1. Morbid obesity ([address]        2. Obesity, Class III, BMI 40-49.9 (morbid obesity) ([facility] [address]        3. Hx of gastroesophageal reflux (GERD)  sucralfate (CARAFATE) 1 GM tablet    omeprazole (PRILOSEC) 40 MG delayed release capsule      4. Hyperthyroidism        5. Graves disease        6. Post-operative pain  oxyCODONE-acetaminophen (PERCOCET) 5-325 MG per tablet      7. Post-operative nausea and vomiting  ondansetron (ZOFRAN) 4 MG tablet          Assessment/Plan:  Carlota Cleveland is a 32 y.o. female is here her preoperative visit prior to bariatric surgery. 1. Patient is an excellent candidate for bariatric surgery and meets NIH criteria for weight loss surgery. Patient has clear medical necessity for bariatric surgery. Patient is well informed, motivated, and has a strong desire for weight loss. 2. In detail, discussed risks and benefits of sleeve gastrectomy. The gastric sleeve procedure is performed utilizing a stapler to remove a portion of the stomach, creating a smaller, banana-shaped tube. Intraoperative EGD will be performed during the procedure and is considered medically necessary to rule out concerns with leak, bleeding, and/or obstruction.      We discussed specific risks of sleeve gastrectomy including but not limited to: pain, infection, bleeding, scar, excess skin, conversion to open approach, hernia, injury to adjacent organs, need for blood transfusion, thromboembolic complications, gastrointestinal leak, stricture, difficulty swallowing, need for revisional surgery, gastroesophageal reflux, esophagitis or esophageal cancer, need for revisional surgery, failure to lose weight or weight regain, nutritional deficiencies, heart attack, stroke, death. 3. Discussed in detail information and expectations regarding the pre-operative period, the bariatric procedure, and postoperative period. 4. The patient has reviewed, completed, and signed consent that she has viewed all pre operative teaching videos. 5. Stop NSAIDS 7 days prior to surgery date. 6.  May continue Aspirin 81 mg po until day before surgery. 7.  RX for Prilosec, Zofran, Percocet, and Carafate. 8.  Reviewed post op follow up appointment schedule. Patient referred to [facility] to schedule post op follow up visits 1.5 weeks, 3 weeks, 3 months, 6 months, and 12 months. 5. Stressed with patient importance of understanding bariatric surgery is a tool and will not work if patient does not continue with healthy lifestyle changes including regular exercise and high protein, low calorie, low carb diet. 10. The dietitian reviewed pre-op diet including high protein diet, sugar/calorie free liquids for 2 weeks prior. Liquids only the day before surgery. Full Liquid Diet - from day of discharge from hospital until first [facility] visit, usually 1.5 weeks. 11. The dietitian reviewed 2 weeks post op diet full liquids. 12.  Discussed anticipated recovery time off from work. Explained to patient this may vary depending on the patient's overall health and personal recovery. Beau-en-y Gastric Bypass 2-4 weeks; Gastric Sleeve 1-2 weeks. 13.  Reviewed assessment and plan in detail. Patient verbalized understanding. Questions answered. 14. Based on physical assessments and evaluations, this patient is medically cleared for surgery. WEIGHT MANAGEMENT:  1. Counseled on weight management and weight loss. 2. Instructed on choosing better foods with alternatives. 3. Advised on low carbohydrate, high protein diet (at least 60 to 80 gm protein daily). 4.  Positive reinforcement provided.       Time: I spent 40 minutes preparing to see patient (including chart review and preparation), obtaining and/or reviewing additional medical history, performing a physical exam and evaluation, documenting clinical information in the electronic health record, independently interpreting results, communicating results to patient, family or caregiver, and/or coordinating care.      Signed: Regina Kwan MD  Bariatric & Minimally Invasive Surgery  10/4/2022

## 2022-09-30 ENCOUNTER — OFFICE VISIT (OUTPATIENT)
Dept: BEHAVIORAL/MENTAL HEALTH CLINIC | Facility: CLINIC | Age: 27
End: 2022-09-30
Payer: COMMERCIAL

## 2022-09-30 DIAGNOSIS — E66.01 OBESITY, MORBID, BMI 40.0-49.9 (HCC): ICD-10-CM

## 2022-09-30 DIAGNOSIS — F50.81 BINGE EATING DISORDER: ICD-10-CM

## 2022-09-30 DIAGNOSIS — F54 PSYCHOLOGICAL AND BEHAVIORAL FACTORS ASSOCIATED WITH DISORDERS OR DISEASES CLASSIFIED ELSEWHERE: Primary | ICD-10-CM

## 2022-09-30 PROCEDURE — 90834 PSYTX W PT 45 MINUTES: CPT | Performed by: SOCIAL WORKER

## 2022-09-30 NOTE — PROGRESS NOTES
BARIATRIC FOLLOW UP NOTE  NAME: Nicanor Oseguera    DATE: 2022    TYPE OF SERVICE: Individual Treatment    LOCATION OF SERVICE: SW at [facility]/ Virtual Visit    DURATION:50 mins    DIAGNOSIS: :    1. Psychological and behavioral factors associated with disorders or diseases classified elsewhere    2. Obesity, morbid, BMI 40.0-49.9 ([facility] [address]        SURGERY STATUS: Approved    MENTAL STATUS EXAM:  Pt was appropriately groomed. Pt was 0x4. No unusual mannerisms were noted. No psychotic symptoms displayed by pt. Pt was cooperative and engaged in the session. Insight and judgment were intact. Denied suicidal or homicidal ideation. Perceptions were appropriate. Attention and concentration were normal.  No memory impairments were noted. Fund of knowledge was within normal limits. Speech pattern and language were intact. 0x4. Denied SI/HI. Mood/Affect: even with congruent affect  Thought Process: linear and coherent    Pt is progressing on goals. Pt is a 32 y.o. female who presents today for the second mandatory appointment with this SW following the initial psychological evaluation for bariatric surgery. The pt was approved for surgery. She endorsed emotional overeating at times and this is being addressed in therapy. Assessment  This SW met with the pt virtually [email or web address]. Nicanor Oseguera (:  1995) is a Established patient, here for evaluation of the following:    Assessment & Plan   Below is the assessment and plan developed based on review of pertinent history, physical exam, labs, studies, and medications. 1. Psychological and behavioral factors associated with disorders or diseases classified elsewhere  2. Obesity, morbid, BMI 40.0-49.9 ([facility] [address]    Return if symptoms worsen or fail to improve.        Subjective   HPI  Review of Systems       Objective   Patient-Reported Vitals  No data recorded     Physical Exam         On this date 2022 I have spent 45 minutes reviewing previous notes, test results and face to face (virtual) with the patient discussing the diagnosis and importance of compliance with the treatment plan as well as documenting on the day of the visit. Lewis Ryder, was evaluated through a synchronous (real-time) audio-video encounter. The patient (or guardian if applicable) is aware that this is a billable service, which includes applicable co-pays. This Virtual Visit was conducted with patient's (and/or legal guardian's) consent. The visit was conducted pursuant to the emergency declaration under the [facility] [place], [facility] authority and the [employer] Act. Patient identification was verified, and a caregiver was present when appropriate. The patient was located at Home: [address] Jocelyn Holder [address] [address]. Provider was located at [facility] ([address] [place] [address]): [address] [place] [address] Dr [facility]  [place],  [address] [place] [address]. --AMIRA Ferris           Eating Behaviors: The pt reports eating behaviors have been going well. The pt has been eating protein and vegetables and  has been limiting carbohydrates. The pt has not been drinking with her meals. The pt  has been eating three meals a day. Any recent weight gain is denied. Straws are not being used. Pt is attempting to practice mindful eating. Exercise: The pt continues to regularly walk. As she improves she would like to start jogging. Carbonation: None    Caffeine:None    Logging Food:  The pt has the [employer] linda and she uses it as needed. Support System: Good/Intact    Medications: Compliant    Alcohol/Drug/Nicotine Use: None    Post Surgery Behavioral Plan Completed: Yes    Clinical Narrative:Pt came in today for the 2-4 week mandatory bariatric follow up appointment. The behavioral plan was completed.   Meal planning, exercise, dealing with holidays and vacations, coping skills, boundary setting and possible relationship issues were addressed. Information on urge surfing was given. Attuned movement and eating were also addressed. The ACT concept of acceptance was also discussed. How to avoid weight recurrence was also addressed. Plan: Pt encouraged to attend the supportive group. Continued adherence to the bariatric regimen was reinforced. Outpatient therapy appointments were offered if needed in the future.     Follow Up: PRN

## 2022-10-04 ENCOUNTER — OFFICE VISIT (OUTPATIENT)
Dept: SURGERY | Age: 27
End: 2022-10-04
Payer: COMMERCIAL

## 2022-10-04 ENCOUNTER — PREP FOR PROCEDURE (OUTPATIENT)
Dept: SURGERY | Age: 27
End: 2022-10-04

## 2022-10-04 VITALS
WEIGHT: 263 LBS | DIASTOLIC BLOOD PRESSURE: 95 MMHG | BODY MASS INDEX: 41.28 KG/M2 | HEIGHT: 67 IN | HEART RATE: 81 BPM | SYSTOLIC BLOOD PRESSURE: 131 MMHG

## 2022-10-04 DIAGNOSIS — R11.2 POST-OPERATIVE NAUSEA AND VOMITING: ICD-10-CM

## 2022-10-04 DIAGNOSIS — Z98.890 POST-OPERATIVE NAUSEA AND VOMITING: ICD-10-CM

## 2022-10-04 DIAGNOSIS — E05.90 HYPERTHYROIDISM: ICD-10-CM

## 2022-10-04 DIAGNOSIS — G89.18 POST-OPERATIVE PAIN: ICD-10-CM

## 2022-10-04 DIAGNOSIS — E05.00 GRAVES DISEASE: ICD-10-CM

## 2022-10-04 DIAGNOSIS — Z87.19 HX OF GASTROESOPHAGEAL REFLUX (GERD): ICD-10-CM

## 2022-10-04 DIAGNOSIS — E66.01 MORBID OBESITY (HCC): Primary | ICD-10-CM

## 2022-10-04 DIAGNOSIS — E66.01 OBESITY, CLASS III, BMI 40-49.9 (MORBID OBESITY) (HCC): ICD-10-CM

## 2022-10-04 PROCEDURE — 99215 OFFICE O/P EST HI 40 MIN: CPT | Performed by: SURGERY

## 2022-10-04 PROCEDURE — APPSS45 APP SPLIT SHARED TIME 31-45 MINUTES: Performed by: PHYSICIAN ASSISTANT

## 2022-10-04 RX ORDER — SUCRALFATE 1 G/1
1 TABLET ORAL 4 TIMES DAILY
Qty: 56 TABLET | Refills: 0 | Status: SHIPPED | OUTPATIENT
Start: 2022-10-04 | End: 2022-10-18

## 2022-10-04 RX ORDER — OMEPRAZOLE 40 MG/1
40 CAPSULE, DELAYED RELEASE ORAL
Qty: 90 CAPSULE | Refills: 0 | Status: SHIPPED | OUTPATIENT
Start: 2022-10-04

## 2022-10-04 RX ORDER — ONDANSETRON 4 MG/1
4 TABLET, FILM COATED ORAL 3 TIMES DAILY PRN
Qty: 30 TABLET | Refills: 0 | Status: SHIPPED | OUTPATIENT
Start: 2022-10-04

## 2022-10-04 RX ORDER — OXYCODONE HYDROCHLORIDE AND ACETAMINOPHEN 5; 325 MG/1; MG/1
1 TABLET ORAL EVERY 6 HOURS PRN
Qty: 20 TABLET | Refills: 0 | Status: SHIPPED | OUTPATIENT
Start: 2022-10-04 | End: 2022-10-09

## 2022-10-19 NOTE — PROGRESS NOTES
Loren Herrera PA-C  Bariatric & Advanced Laparoscopic Surgery & Endoscopy  [address] [place] [address] [address], [address] [facility]  [place] [place]  Phone [phone]   Fax [phone]    Date of visit: 10/27/2022      Primary/Requesting provider: None Provider         Name: Vane Andre      MRN: [id number]       : 1995       Age: 32 y.o. Sex: female        PCP: None Provider     CC:  Bariatric monthly dietary follow up    Surgeon: Dr. Mike Schneider    Procedure: laparoscopic sleeve gastrectomy    Surgical Consult Date: 2022    The patient is a 32 y.o. female presenting with a height of 5' 7\" (1.702 m) and weight of 257 lb (116.6 kg), giving her a Body mass index is 40.25 kg/m². She has an ideal body weight of 159 lbs, and excess body weight of 98 lbs. She started our program with a weight of 259 lbs, lost 2lbs. She is in the process of completing all aspects of our prep program and to be deemed an acceptable candidate for bariatric surgery. Patient has a long term history of obesity with multiple failed attempts at weight reduction. Patient denies any changes in health history or physical health since last visit. Patient has been adherent to her diet and exercise regimen. Patient feels that she is well informed regarding her bariatric surgery choice and would like to proceed with a laparoscopic sleeve gastrectomy for weight reduction, improvement of her medical conditions, and improved quality of life. Patient verbalized understanding of the risks associated with bariatric surgery. Patient also verbalized understanding that bariatric surgery is a tool and that weight reduction is dependent on behavioral changes in regards to what she eats and how much. NUTRITION ASSESSMENT:   Diet Recall:   Breakfast: scrambled eggs, turkey ceballos, avocado. Lunch: stuffed bell pepper. Dinner: 6\" sub sandwich.   Drinks: water, crystal light  Habits:   Eating 3x/day: yes  Elimination of caffeine and carbonated beverages: yes  Practicing not drinking with meals: yes     EXERCISE ASSESSMENT: Pt is currently exercising via walking 3-4d/week for 45-60 mins. NIH Guidelines reviewed with pt. PSYCHOLOGICAL EVALUATION:   Completed, 09/30/2022 with Artur Arechiga deeming her an appropriate surgical candidate. DIETITIAN EVALUATION:  Completed with Nomi Mittal deeming her an appropriate surgical candidate. BARIATRIC LABS:  Completed, 08/19/2022 (A1C 5.7, vitamin D 17.0)  UPPER GI:  Completed, 08/19/2022  Unremarkable upper GI.  PHYSICAL THERAPY EVALUATION FOR MOBILITY OPTIMIZATION:  Completed, 08/19/2022    MEDICAL HISTORY:  Morbid Obesity  Anxiety  Hyperthyroidism  Grave's Disease - well controlled - CITLALI Tolbert     Comorbidity Yes or No   Hypertension No   Hyperlipidemia No   Diabetes Mellitus No   Coronary Artery Disease No   Gastroesophageal Reflux No   Obstructive Sleep Apnea No   Cancer No   Asthma No   Osteoarthritis No   Joint Pain Yes      Admits to rare GERD symptoms including heartburn with certain foods. Denies medication, previous EGD, or dysphagia. Other Yes or No   Venous Stasis No   Dialysis No   Long term use of steroid or immunocompromised conditions No   On Anticoagulants No      PMH:  Past Medical History:   Diagnosis Date    Graves disease        PSH:  Past Surgical History:   Procedure Laterality Date    MEDIAL COLLATERAL LIGAMENT REPAIR, KNEE Right 12/07/2007       MEDS:  Current Outpatient Medications   Medication Sig Dispense Refill    sucralfate (CARAFATE) 1 GM tablet Take 1 tablet by mouth 4 times daily for 14 days Dissolve in water and drink.  56 tablet 0    omeprazole (PRILOSEC) 40 MG delayed release capsule Take 1 capsule by mouth every morning (before breakfast) 90 capsule 0    ondansetron (ZOFRAN) 4 MG tablet Take 1 tablet by mouth 3 times daily as needed for Nausea or Vomiting 30 tablet 0    methIMAzole (TAPAZOLE) 10 MG tablet       norgestimate-ethinyl estradiol (ORTHO-CYCLEN) 0.25-35 MG-MCG per tablet        No current facility-administered medications for this visit. ALLERGIES:    No Known Allergies    SH:       FH:  Family History   Problem Relation Age of Onset    Diabetes Maternal Grandmother           Physical Exam:     BP (!) 136/90   Pulse 81   Ht 5' 7\" (1.702 m)   Wt 257 lb (116.6 kg)   BMI 40.25 kg/m²     General:  Well-developed, well-nourished, no distress. Psych:  Cooperative, good insight and judgement. Neuro:  Alert, oriented to person, place and time. Lungs:  Unlabored breathing. Symmetrical chest expansion. Heart:  Regular rate and rhythm. Abdomen:  Soft, obese, non-tender, non-distended. No guarding or rebound. No palpable masses. Labs: All recent labs were reviewed. Lab Results   Component Value Date    LABA1C 5.7 (H) 08/19/2022     No results found for: TSHFT4, TSH   Lab Results   Component Value Date    VITD25 17.0 (L) 08/19/2022      Lab Results   Component Value Date    XDVDUEBA75 328 08/19/2022      Lab Results   Component Value Date    IRON 94 08/19/2022    FERRITIN 27 08/19/2022        Imaging: All images were independently reviewed by me. Diagnosis Orders   1. Morbid obesity ([facility] [address]        2. Obesity, Class III, BMI 40-49.9 (morbid obesity) ([facility] [address]        3. Hx of gastroesophageal reflux (GERD)        4. Hyperthyroidism        5. Graves disease        6. Dietary counseling        7. Exercise counseling            Assessment/Plan:    Manfred Cruz is a 32 y.o. female is here for monthly follow-up visit prior to bariatric surgery with medical co-morbidities related to morbid obesity. Patient is an excellent candidate for bariatric surgery and meets NIH criteria for weight loss surgery. In detail, discussed risks and benefits of laparoscopic sleeve gastrectomy. Reviewed information and expectations regarding the pre-operative period, the bariatric procedure, and postoperative period.  Stressed with patient importance of understanding bariatric surgery is a tool and will not work if patient does not continue with healthy lifestyle changes including regular exercise and high protein, low calorie, low carb diet. Patient was seen by the dietitian today for continued evaluation and management regarding lifestyle and dietary changes. Reviewed assessment and plan in detail. Patient verbalized understanding. Questions answered. Recommendations: Follow up for surgery scheduled on 11/28/2022. Amy Benjamin PA-C  Bariatric Surgery  10/27/2022    Counseling time:counseling time more than 50% of visit: 25 minutes : I spent this time preparing to see patient (including chart review and preparation), obtaining and/or reviewing additional medical history, performing a physical exam and evaluation, documenting clinical information in the electronic health record, independently interpreting results, communicating results to patient, family or caregiver, and/or coordinating care.

## 2022-10-26 NOTE — PROGRESS NOTES
Garrett Rosenberg, MS, RD, LD  Surgical Weight Loss Dietitian  [address], [address] [facility]  [place]n [place]  Phone [phone]   Fax [phone]    Anthropometrics:Ht: 76, wt: 257#, 162% IBW, 40.25 BMI    Evaluation:  BW: 257# (-2#, visit 5 of supervised weight loss program)   Pt reports good dietary compliance over the past month. She is consuming 3 meals/d. She reports good compliance with mindful eating behaviors. Pt is drinking without meals. Pt is exercising via walking 3-4d/week for 45-60 mins. Pt has found acceptable dietary supplements for use after surgery. Diet Recall:  Breakfast: scrambled eggs, turkey ceballos, avocado. Lunch: stuffed bell pepper. Dinner: 6\" sub sandwich. Drinks: water, crystal light  Habits:   Eating mindfully: yes   Drinking after meals: yes, currently waiting 30 minutes   Elimination of sugary/carbonated/caffeinated/alcoholic beverages: yes   Drinking without straws: yes    Nutrition Diagnosis:  Morbid obesity R/T excessive energy intake and yoyo dieting as evidenced by BMI = 40.25 and 162 % IBW. Intervention:    Continue with current diet rx. Discussed exploring and trialing nutritional supplements for pre/post-op care. Evaluated diet recall and identified modifications. Encouraged lean protein focus at all meals, non-starchy vegetables, complex carbs, and eating 3meals/day. Pt goal to continue eating 3 times daily, with meal priority and mindful eating behaviors. Discussed proper fluid intake and encouraged increase in consumption of hydrating fluids. Pt goal to continue hydrating fluid intake, and continue waiting to drink after meals to goal of 30 mins. Encouraged continued activity and increase as able. Pt goal to increase current exercise routine as tolerated. *Pt verbalizes understanding of information provided during this session. Monitoring and Evaluation:   Follow for adherence to diet and exercise goals. F/U per MD    Impression:      Readiness to learn and change: High   Comprehension level: High   Anticipated compliance: Moderate     Excellent SWL candidate at this time. Pt has made good changes during supervised diet with RD. RD feels as though pt will be able to adhere to post-operative instructions and plan of care. Pt understands the habits that must be in place to support the SWL tool. I look forward to continuing to work with pt.      Elisa Ramirez, MS, RD, LD

## 2022-10-27 ENCOUNTER — OFFICE VISIT (OUTPATIENT)
Dept: SURGERY | Age: 27
End: 2022-10-27
Payer: COMMERCIAL

## 2022-10-27 VITALS
HEIGHT: 67 IN | HEART RATE: 81 BPM | DIASTOLIC BLOOD PRESSURE: 90 MMHG | SYSTOLIC BLOOD PRESSURE: 136 MMHG | WEIGHT: 257 LBS | BODY MASS INDEX: 40.34 KG/M2

## 2022-10-27 DIAGNOSIS — E66.01 OBESITY, CLASS III, BMI 40-49.9 (MORBID OBESITY) (HCC): ICD-10-CM

## 2022-10-27 DIAGNOSIS — Z71.82 EXERCISE COUNSELING: ICD-10-CM

## 2022-10-27 DIAGNOSIS — E05.00 GRAVES DISEASE: ICD-10-CM

## 2022-10-27 DIAGNOSIS — E05.90 HYPERTHYROIDISM: ICD-10-CM

## 2022-10-27 DIAGNOSIS — E66.01 MORBID OBESITY (HCC): Primary | ICD-10-CM

## 2022-10-27 DIAGNOSIS — Z71.3 DIETARY COUNSELING: ICD-10-CM

## 2022-10-27 DIAGNOSIS — Z87.19 HX OF GASTROESOPHAGEAL REFLUX (GERD): ICD-10-CM

## 2022-10-27 PROCEDURE — 99213 OFFICE O/P EST LOW 20 MIN: CPT | Performed by: PHYSICIAN ASSISTANT

## 2022-11-16 ENCOUNTER — HOSPITAL ENCOUNTER (OUTPATIENT)
Dept: SURGERY | Age: 27
Discharge: HOME | End: 2022-11-19
Payer: COMMERCIAL

## 2022-11-16 VITALS
HEIGHT: 67 IN | SYSTOLIC BLOOD PRESSURE: 135 MMHG | RESPIRATION RATE: 16 BRPM | WEIGHT: 260.6 LBS | DIASTOLIC BLOOD PRESSURE: 87 MMHG | OXYGEN SATURATION: 92 % | BODY MASS INDEX: 40.9 KG/M2 | TEMPERATURE: 97 F | HEART RATE: 90 BPM

## 2022-11-16 LAB
ANION GAP SERPL CALC-SCNC: 6 MMOL/L (ref 2–11)
BUN SERPL-MCNC: 16 MG/DL (ref 6–23)
CALCIUM SERPL-MCNC: 9.6 MG/DL (ref 8.3–10.4)
CHLORIDE SERPL-SCNC: 105 MMOL/L (ref 101–110)
CO2 SERPL-SCNC: 24 MMOL/L (ref 21–32)
CREAT SERPL-MCNC: 0.82 MG/DL (ref 0.6–1)
ERYTHROCYTE [DISTWIDTH] IN BLOOD BY AUTOMATED COUNT: 13.2 % (ref 11.9–14.6)
GLUCOSE SERPL-MCNC: 86 MG/DL (ref 65–100)
HCT VFR BLD AUTO: 37.6 % (ref 35.8–46.3)
HGB BLD-MCNC: 12.4 G/DL (ref 11.7–15.4)
MCH RBC QN AUTO: 27 PG (ref 26.1–32.9)
MCHC RBC AUTO-ENTMCNC: 33 G/DL (ref 31.4–35)
MCV RBC AUTO: 81.7 FL (ref 82–102)
NRBC # BLD: 0 K/UL (ref 0–0.2)
PLATELET # BLD AUTO: 388 K/UL (ref 150–450)
PMV BLD AUTO: 10.5 FL (ref 9.4–12.3)
POTASSIUM SERPL-SCNC: 4.4 MMOL/L (ref 3.5–5.1)
RBC # BLD AUTO: 4.6 M/UL (ref 4.05–5.2)
SODIUM SERPL-SCNC: 135 MMOL/L (ref 133–143)
WBC # BLD AUTO: 14.6 K/UL (ref 4.3–11.1)

## 2022-11-16 PROCEDURE — 80048 BASIC METABOLIC PNL TOTAL CA: CPT

## 2022-11-16 PROCEDURE — 85027 COMPLETE CBC AUTOMATED: CPT

## 2022-11-16 NOTE — PERIOP NOTE
WBC elevated and faxed to surgeon. All other lab results within anesthesia guidelines.              Latest Reference Range & Units 11/16/22 12:57   WBC 4.3 - 11.1 K/uL 14.6 (H)   RBC 4.05 - 5.2 M/uL 4.60   Hemoglobin Quant 11.7 - 15.4 g/dL 12.4   Hematocrit 35.8 - 46.3 % 37.6   MCV 82.0 - 102.0 FL 81.7 (L)   MCH 26.1 - 32.9 PG 27.0   MCHC 31.4 - 35.0 g/dL 33.0   MPV 9.4 - 12.3 FL 10.5   RDW 11.9 - 14.6 % 13.2   Platelet Count 189 - 450 K/uL 388   Nucleated Red Blood Cells 0.0 - 0.2 K/uL 0.00   (H): Data is abnormally high  (L): Data is abnormally low

## 2022-11-16 NOTE — PERIOP NOTE
Patient verified name and     Order for consent not found in EHR. Type 3 surgery, PAT walk in assessment complete. Labs per surgeon: orders not received. Labs per anesthesia protocol: CBC, BMP; results pending. EKG: none needed per anesthesia protocol. Hospital approved surgical skin cleanser and instructions given per hospital policy. Patient provided with and instructed on educational handouts including Guide to Surgery, Pain Management, Hand Hygiene, Blood Transfusion Education, and [place] [facility] Brochure. Patient answered medical/surgical history questions at their best of ability. All prior to admission medications documented in [facility]. Original medication prescription bottle not visualized during patient appointment. Patient instructed to hold all vitamins 7 days prior to surgery and NSAIDS 5 days prior to surgery, patient verbalized understanding. Patient teach back successful and patient demonstrates knowledge of instructions.

## 2022-11-16 NOTE — PERIOP NOTE
SURGICAL WEIGHT LOSS Enhanced Recovery After Surgery: diabetic and non-diabetic patients      The evening before surgery 11/27/22, drink 1 bottles of the Ensure Pre-Surgery drink. Please do not eat or drink after midnight the night before your surgery. Bring your patient handbook with you to the hospital.        Things to Remember:      1. You will be up in a chair the evening of surgery and sipping on clear liquids, once released by your surgeon. 2. Beginning the day of surgery, you will be out of the bed as able, once released by your hospital team. We encourage you to be sitting up in a chair, walking in the schaefer, doing exercises as advised by physical therapy, etc.       3. You will be given scheduled non-narcotic pain medication to help keep your pain under control. You will have stronger pain medication ordered for break through pain. 4. All of these measures are geared toward improving your overall surgical recovery and   decreasing the risk of complications.

## 2022-11-16 NOTE — PERIOP NOTE
PLEASE CONTINUE TAKING ALL PRESCRIPTION MEDICATIONS UP TO THE DAY OF SURGERY UNLESS OTHERWISE DIRECTED BELOW. DISCONTINUE all vitamins and supplements 7 days prior to surgery. DISCONTINUE Non-Steriodal Anti-Inflammatory (NSAIDS) such as Advil and Aleve 5 days prior to surgery. Home Medications to take  the day of surgery   None            Home Medications   to Hold   None         Comments   Bring Incentive spirometer and Brisa-hex wash to the hospital on the day of surgery. On the day before surgery please take Acetaminophen 1000mg in the morning and then again before bed. You may substitute for Tylenol 650 mg. Please do not bring home medications with you on the day of surgery unless otherwise directed by your nurse. If you are instructed to bring home medications, please give them to your nurse as they will be administered by the nursing staff. If you have any questions, please call Luisito Penny [phone]. A copy of this note was provided to the patient for reference.

## 2022-11-28 ENCOUNTER — ANESTHESIA EVENT (OUTPATIENT)
Dept: SURGERY | Age: 27
DRG: 621 | End: 2022-11-28
Payer: COMMERCIAL

## 2022-11-28 ENCOUNTER — ANESTHESIA (OUTPATIENT)
Dept: SURGERY | Age: 27
DRG: 621 | End: 2022-11-28
Payer: COMMERCIAL

## 2022-11-28 ENCOUNTER — HOSPITAL ENCOUNTER (INPATIENT)
Age: 27
LOS: 1 days | Discharge: HOME | DRG: 621 | End: 2022-11-29
Attending: SURGERY | Admitting: SURGERY
Payer: COMMERCIAL

## 2022-11-28 PROBLEM — E66.01 MORBID OBESITY (HCC): Status: ACTIVE | Noted: 2022-11-28

## 2022-11-28 LAB
ABO + RH BLD: NORMAL
BLOOD GROUP ANTIBODIES SERPL: NORMAL
HCG UR QL: NEGATIVE
SPECIMEN EXP DATE BLD: NORMAL

## 2022-11-28 PROCEDURE — 3700000000 HC ANESTHESIA ATTENDED CARE: Performed by: SURGERY

## 2022-11-28 PROCEDURE — 1100000000 HC RM PRIVATE

## 2022-11-28 PROCEDURE — 2580000003 HC RX 258: Performed by: ANESTHESIOLOGY

## 2022-11-28 PROCEDURE — 2580000003 HC RX 258: Performed by: PHYSICIAN ASSISTANT

## 2022-11-28 PROCEDURE — 0DJ08ZZ INSPECTION OF UPPER INTESTINAL TRACT, VIA NATURAL OR ARTIFICIAL OPENING ENDOSCOPIC: ICD-10-PCS | Performed by: SURGERY

## 2022-11-28 PROCEDURE — 88312 SPECIAL STAINS GROUP 1: CPT

## 2022-11-28 PROCEDURE — 7100000001 HC PACU RECOVERY - ADDTL 15 MIN: Performed by: SURGERY

## 2022-11-28 PROCEDURE — 2709999900 HC NON-CHARGEABLE SUPPLY: Performed by: SURGERY

## 2022-11-28 PROCEDURE — 88307 TISSUE EXAM BY PATHOLOGIST: CPT

## 2022-11-28 PROCEDURE — 6360000002 HC RX W HCPCS: Performed by: NURSE ANESTHETIST, CERTIFIED REGISTERED

## 2022-11-28 PROCEDURE — 2720000010 HC SURG SUPPLY STERILE: Performed by: SURGERY

## 2022-11-28 PROCEDURE — 81025 URINE PREGNANCY TEST: CPT

## 2022-11-28 PROCEDURE — 6370000000 HC RX 637 (ALT 250 FOR IP): Performed by: ANESTHESIOLOGY

## 2022-11-28 PROCEDURE — 3600000009 HC SURGERY ROBOT BASE: Performed by: SURGERY

## 2022-11-28 PROCEDURE — 2500000003 HC RX 250 WO HCPCS: Performed by: NURSE ANESTHETIST, CERTIFIED REGISTERED

## 2022-11-28 PROCEDURE — 2580000003 HC RX 258: Performed by: NURSE ANESTHETIST, CERTIFIED REGISTERED

## 2022-11-28 PROCEDURE — 6360000002 HC RX W HCPCS: Performed by: PHYSICIAN ASSISTANT

## 2022-11-28 PROCEDURE — 2500000003 HC RX 250 WO HCPCS: Performed by: PHYSICIAN ASSISTANT

## 2022-11-28 PROCEDURE — 7100000000 HC PACU RECOVERY - FIRST 15 MIN: Performed by: SURGERY

## 2022-11-28 PROCEDURE — 3700000001 HC ADD 15 MINUTES (ANESTHESIA): Performed by: SURGERY

## 2022-11-28 PROCEDURE — 6360000002 HC RX W HCPCS: Performed by: ANESTHESIOLOGY

## 2022-11-28 PROCEDURE — 2500000003 HC RX 250 WO HCPCS: Performed by: ANESTHESIOLOGY

## 2022-11-28 PROCEDURE — S2900 ROBOTIC SURGICAL SYSTEM: HCPCS | Performed by: SURGERY

## 2022-11-28 PROCEDURE — 86901 BLOOD TYPING SEROLOGIC RH(D): CPT

## 2022-11-28 PROCEDURE — 3600000019 HC SURGERY ROBOT ADDTL 15MIN: Performed by: SURGERY

## 2022-11-28 PROCEDURE — 2500000003 HC RX 250 WO HCPCS: Performed by: SURGERY

## 2022-11-28 PROCEDURE — 8E0W4CZ ROBOTIC ASSISTED PROCEDURE OF TRUNK REGION, PERCUTANEOUS ENDOSCOPIC APPROACH: ICD-10-PCS | Performed by: SURGERY

## 2022-11-28 PROCEDURE — 6370000000 HC RX 637 (ALT 250 FOR IP): Performed by: PHYSICIAN ASSISTANT

## 2022-11-28 PROCEDURE — 0DB64Z3 EXCISION OF STOMACH, PERCUTANEOUS ENDOSCOPIC APPROACH, VERTICAL: ICD-10-PCS | Performed by: SURGERY

## 2022-11-28 PROCEDURE — 43775 LAP SLEEVE GASTRECTOMY: CPT | Performed by: SURGERY

## 2022-11-28 PROCEDURE — 94760 N-INVAS EAR/PLS OXIMETRY 1: CPT

## 2022-11-28 RX ORDER — ACETAMINOPHEN 500 MG
1000 TABLET ORAL EVERY 6 HOURS PRN
COMMUNITY

## 2022-11-28 RX ORDER — LIDOCAINE HYDROCHLORIDE 20 MG/ML
INJECTION, SOLUTION EPIDURAL; INFILTRATION; INTRACAUDAL; PERINEURAL PRN
Status: DISCONTINUED | OUTPATIENT
Start: 2022-11-28 | End: 2022-11-28 | Stop reason: SDUPTHER

## 2022-11-28 RX ORDER — PROPOFOL 10 MG/ML
INJECTION, EMULSION INTRAVENOUS PRN
Status: DISCONTINUED | OUTPATIENT
Start: 2022-11-28 | End: 2022-11-28 | Stop reason: SDUPTHER

## 2022-11-28 RX ORDER — DIPHENHYDRAMINE HCL 25 MG
25 CAPSULE ORAL EVERY 6 HOURS PRN
Status: DISCONTINUED | OUTPATIENT
Start: 2022-11-28 | End: 2022-11-29 | Stop reason: HOSPADM

## 2022-11-28 RX ORDER — HEPARIN SODIUM 5000 [USP'U]/ML
5000 INJECTION, SOLUTION INTRAVENOUS; SUBCUTANEOUS EVERY 8 HOURS SCHEDULED
Status: DISCONTINUED | OUTPATIENT
Start: 2022-11-28 | End: 2022-11-29

## 2022-11-28 RX ORDER — SODIUM CHLORIDE 9 MG/ML
INJECTION, SOLUTION INTRAVENOUS PRN
Status: DISCONTINUED | OUTPATIENT
Start: 2022-11-28 | End: 2022-11-28 | Stop reason: HOSPADM

## 2022-11-28 RX ORDER — HALOPERIDOL 5 MG/ML
1 INJECTION INTRAMUSCULAR
Status: DISCONTINUED | OUTPATIENT
Start: 2022-11-28 | End: 2022-11-28 | Stop reason: HOSPADM

## 2022-11-28 RX ORDER — IPRATROPIUM BROMIDE AND ALBUTEROL SULFATE 2.5; .5 MG/3ML; MG/3ML
1 SOLUTION RESPIRATORY (INHALATION)
Status: DISCONTINUED | OUTPATIENT
Start: 2022-11-28 | End: 2022-11-28 | Stop reason: HOSPADM

## 2022-11-28 RX ORDER — SIMETHICONE 80 MG
80 TABLET,CHEWABLE ORAL EVERY 6 HOURS PRN
Status: DISCONTINUED | OUTPATIENT
Start: 2022-11-28 | End: 2022-11-29 | Stop reason: HOSPADM

## 2022-11-28 RX ORDER — SUCCINYLCHOLINE/SOD CL,ISO/PF 200MG/10ML
SYRINGE (ML) INTRAVENOUS PRN
Status: DISCONTINUED | OUTPATIENT
Start: 2022-11-28 | End: 2022-11-28 | Stop reason: SDUPTHER

## 2022-11-28 RX ORDER — HYDRALAZINE HYDROCHLORIDE 20 MG/ML
10 INJECTION INTRAMUSCULAR; INTRAVENOUS EVERY 6 HOURS PRN
Status: DISCONTINUED | OUTPATIENT
Start: 2022-11-28 | End: 2022-11-29 | Stop reason: HOSPADM

## 2022-11-28 RX ORDER — HYDROMORPHONE HYDROCHLORIDE 1 MG/ML
0.5 INJECTION, SOLUTION INTRAMUSCULAR; INTRAVENOUS; SUBCUTANEOUS EVERY 5 MIN PRN
Status: COMPLETED | OUTPATIENT
Start: 2022-11-28 | End: 2022-11-28

## 2022-11-28 RX ORDER — NEOSTIGMINE METHYLSULFATE 1 MG/ML
INJECTION, SOLUTION INTRAVENOUS PRN
Status: DISCONTINUED | OUTPATIENT
Start: 2022-11-28 | End: 2022-11-28 | Stop reason: SDUPTHER

## 2022-11-28 RX ORDER — BUPIVACAINE HYDROCHLORIDE 5 MG/ML
INJECTION, SOLUTION EPIDURAL; INTRACAUDAL PRN
Status: DISCONTINUED | OUTPATIENT
Start: 2022-11-28 | End: 2022-11-28 | Stop reason: HOSPADM

## 2022-11-28 RX ORDER — SODIUM CHLORIDE 0.9 % (FLUSH) 0.9 %
5-40 SYRINGE (ML) INJECTION EVERY 12 HOURS SCHEDULED
Status: DISCONTINUED | OUTPATIENT
Start: 2022-11-28 | End: 2022-11-29 | Stop reason: HOSPADM

## 2022-11-28 RX ORDER — SODIUM CHLORIDE, SODIUM LACTATE, POTASSIUM CHLORIDE, CALCIUM CHLORIDE 600; 310; 30; 20 MG/100ML; MG/100ML; MG/100ML; MG/100ML
INJECTION, SOLUTION INTRAVENOUS CONTINUOUS
Status: DISCONTINUED | OUTPATIENT
Start: 2022-11-28 | End: 2022-11-28 | Stop reason: HOSPADM

## 2022-11-28 RX ORDER — SODIUM CHLORIDE 0.9 % (FLUSH) 0.9 %
5-40 SYRINGE (ML) INJECTION EVERY 12 HOURS SCHEDULED
Status: DISCONTINUED | OUTPATIENT
Start: 2022-11-28 | End: 2022-11-28 | Stop reason: HOSPADM

## 2022-11-28 RX ORDER — FENTANYL CITRATE 50 UG/ML
INJECTION, SOLUTION INTRAMUSCULAR; INTRAVENOUS PRN
Status: DISCONTINUED | OUTPATIENT
Start: 2022-11-28 | End: 2022-11-28 | Stop reason: SDUPTHER

## 2022-11-28 RX ORDER — PROCHLORPERAZINE EDISYLATE 5 MG/ML
5 INJECTION INTRAMUSCULAR; INTRAVENOUS
Status: DISCONTINUED | OUTPATIENT
Start: 2022-11-28 | End: 2022-11-28 | Stop reason: HOSPADM

## 2022-11-28 RX ORDER — SODIUM CHLORIDE, SODIUM LACTATE, POTASSIUM CHLORIDE, CALCIUM CHLORIDE 600; 310; 30; 20 MG/100ML; MG/100ML; MG/100ML; MG/100ML
INJECTION, SOLUTION INTRAVENOUS CONTINUOUS PRN
Status: DISCONTINUED | OUTPATIENT
Start: 2022-11-28 | End: 2022-11-28 | Stop reason: SDUPTHER

## 2022-11-28 RX ORDER — OXYCODONE HYDROCHLORIDE 5 MG/1
5 TABLET ORAL EVERY 4 HOURS PRN
Status: DISCONTINUED | OUTPATIENT
Start: 2022-11-28 | End: 2022-11-29 | Stop reason: HOSPADM

## 2022-11-28 RX ORDER — HEPARIN SODIUM 5000 [USP'U]/ML
5000 INJECTION, SOLUTION INTRAVENOUS; SUBCUTANEOUS ONCE
Status: COMPLETED | OUTPATIENT
Start: 2022-11-28 | End: 2022-11-28

## 2022-11-28 RX ORDER — ROCURONIUM BROMIDE 10 MG/ML
INJECTION, SOLUTION INTRAVENOUS PRN
Status: DISCONTINUED | OUTPATIENT
Start: 2022-11-28 | End: 2022-11-28 | Stop reason: SDUPTHER

## 2022-11-28 RX ORDER — ACETAMINOPHEN 500 MG
1000 TABLET ORAL ONCE
Status: COMPLETED | OUTPATIENT
Start: 2022-11-28 | End: 2022-11-28

## 2022-11-28 RX ORDER — KETOROLAC TROMETHAMINE 30 MG/ML
INJECTION, SOLUTION INTRAMUSCULAR; INTRAVENOUS PRN
Status: DISCONTINUED | OUTPATIENT
Start: 2022-11-28 | End: 2022-11-28 | Stop reason: SDUPTHER

## 2022-11-28 RX ORDER — PROCHLORPERAZINE EDISYLATE 5 MG/ML
10 INJECTION INTRAMUSCULAR; INTRAVENOUS EVERY 6 HOURS PRN
Status: DISCONTINUED | OUTPATIENT
Start: 2022-11-28 | End: 2022-11-29 | Stop reason: HOSPADM

## 2022-11-28 RX ORDER — CEFAZOLIN SODIUM 1 G/3ML
INJECTION, POWDER, FOR SOLUTION INTRAMUSCULAR; INTRAVENOUS PRN
Status: DISCONTINUED | OUTPATIENT
Start: 2022-11-28 | End: 2022-11-28 | Stop reason: SDUPTHER

## 2022-11-28 RX ORDER — METOCLOPRAMIDE HYDROCHLORIDE 5 MG/ML
10 INJECTION INTRAMUSCULAR; INTRAVENOUS ONCE
Status: COMPLETED | OUTPATIENT
Start: 2022-11-28 | End: 2022-11-28

## 2022-11-28 RX ORDER — LIDOCAINE HYDROCHLORIDE 10 MG/ML
1 INJECTION, SOLUTION INFILTRATION; PERINEURAL
Status: DISCONTINUED | OUTPATIENT
Start: 2022-11-28 | End: 2022-11-28 | Stop reason: HOSPADM

## 2022-11-28 RX ORDER — DIPHENHYDRAMINE HYDROCHLORIDE 50 MG/ML
25 INJECTION INTRAMUSCULAR; INTRAVENOUS EVERY 6 HOURS PRN
Status: DISCONTINUED | OUTPATIENT
Start: 2022-11-28 | End: 2022-11-29 | Stop reason: HOSPADM

## 2022-11-28 RX ORDER — SODIUM CHLORIDE 0.9 % (FLUSH) 0.9 %
5-40 SYRINGE (ML) INJECTION PRN
Status: DISCONTINUED | OUTPATIENT
Start: 2022-11-28 | End: 2022-11-29 | Stop reason: HOSPADM

## 2022-11-28 RX ORDER — MIDAZOLAM HYDROCHLORIDE 2 MG/2ML
2 INJECTION, SOLUTION INTRAMUSCULAR; INTRAVENOUS
Status: COMPLETED | OUTPATIENT
Start: 2022-11-28 | End: 2022-11-28

## 2022-11-28 RX ORDER — GLYCOPYRROLATE 0.2 MG/ML
INJECTION INTRAMUSCULAR; INTRAVENOUS PRN
Status: DISCONTINUED | OUTPATIENT
Start: 2022-11-28 | End: 2022-11-28 | Stop reason: SDUPTHER

## 2022-11-28 RX ORDER — GABAPENTIN 300 MG/1
300 CAPSULE ORAL 3 TIMES DAILY
Status: DISCONTINUED | OUTPATIENT
Start: 2022-11-28 | End: 2022-11-29 | Stop reason: HOSPADM

## 2022-11-28 RX ORDER — FENTANYL CITRATE 50 UG/ML
100 INJECTION, SOLUTION INTRAMUSCULAR; INTRAVENOUS
Status: DISCONTINUED | OUTPATIENT
Start: 2022-11-28 | End: 2022-11-28 | Stop reason: HOSPADM

## 2022-11-28 RX ORDER — SODIUM CHLORIDE 0.9 % (FLUSH) 0.9 %
5-40 SYRINGE (ML) INJECTION PRN
Status: DISCONTINUED | OUTPATIENT
Start: 2022-11-28 | End: 2022-11-28 | Stop reason: HOSPADM

## 2022-11-28 RX ORDER — KETAMINE HYDROCHLORIDE 50 MG/ML
INJECTION, SOLUTION, CONCENTRATE INTRAMUSCULAR; INTRAVENOUS PRN
Status: DISCONTINUED | OUTPATIENT
Start: 2022-11-28 | End: 2022-11-28 | Stop reason: SDUPTHER

## 2022-11-28 RX ORDER — ONDANSETRON 2 MG/ML
4 INJECTION INTRAMUSCULAR; INTRAVENOUS EVERY 6 HOURS PRN
Status: DISCONTINUED | OUTPATIENT
Start: 2022-11-28 | End: 2022-11-29 | Stop reason: HOSPADM

## 2022-11-28 RX ORDER — SODIUM CHLORIDE AND POTASSIUM CHLORIDE .9; .15 G/100ML; G/100ML
SOLUTION INTRAVENOUS CONTINUOUS
Status: DISCONTINUED | OUTPATIENT
Start: 2022-11-28 | End: 2022-11-29 | Stop reason: HOSPADM

## 2022-11-28 RX ORDER — OXYCODONE HYDROCHLORIDE 5 MG/1
5 TABLET ORAL
Status: DISCONTINUED | OUTPATIENT
Start: 2022-11-28 | End: 2022-11-28 | Stop reason: HOSPADM

## 2022-11-28 RX ORDER — SODIUM CHLORIDE 9 MG/ML
INJECTION, SOLUTION INTRAVENOUS PRN
Status: DISCONTINUED | OUTPATIENT
Start: 2022-11-28 | End: 2022-11-29 | Stop reason: HOSPADM

## 2022-11-28 RX ORDER — SUCRALFATE 1 G/1
1 TABLET ORAL EVERY 6 HOURS SCHEDULED
Status: DISCONTINUED | OUTPATIENT
Start: 2022-11-28 | End: 2022-11-29 | Stop reason: HOSPADM

## 2022-11-28 RX ORDER — APREPITANT 40 MG/1
40 CAPSULE ORAL ONCE
Status: COMPLETED | OUTPATIENT
Start: 2022-11-28 | End: 2022-11-28

## 2022-11-28 RX ORDER — SCOLOPAMINE TRANSDERMAL SYSTEM 1 MG/1
1 PATCH, EXTENDED RELEASE TRANSDERMAL
Status: DISCONTINUED | OUTPATIENT
Start: 2022-11-28 | End: 2022-11-29 | Stop reason: HOSPADM

## 2022-11-28 RX ORDER — ACETAMINOPHEN 500 MG
1000 TABLET ORAL EVERY 6 HOURS
Status: DISCONTINUED | OUTPATIENT
Start: 2022-11-28 | End: 2022-11-29 | Stop reason: HOSPADM

## 2022-11-28 RX ORDER — ONDANSETRON 2 MG/ML
4 INJECTION INTRAMUSCULAR; INTRAVENOUS ONCE
Status: DISCONTINUED | OUTPATIENT
Start: 2022-11-28 | End: 2022-11-28 | Stop reason: HOSPADM

## 2022-11-28 RX ORDER — LIDOCAINE HYDROCHLORIDE ANHYDROUS AND DEXTROSE MONOHYDRATE .4; 5 G/100ML; G/100ML
1 INJECTION, SOLUTION INTRAVENOUS CONTINUOUS
Status: DISPENSED | OUTPATIENT
Start: 2022-11-28 | End: 2022-11-29

## 2022-11-28 RX ORDER — KETOROLAC TROMETHAMINE 30 MG/ML
30 INJECTION, SOLUTION INTRAMUSCULAR; INTRAVENOUS EVERY 6 HOURS
Status: COMPLETED | OUTPATIENT
Start: 2022-11-28 | End: 2022-11-29

## 2022-11-28 RX ADMIN — Medication 200 MG: at 11:34

## 2022-11-28 RX ADMIN — KETAMINE HYDROCHLORIDE 20 MG: 50 INJECTION, SOLUTION INTRAMUSCULAR; INTRAVENOUS at 12:24

## 2022-11-28 RX ADMIN — FENTANYL CITRATE 50 MCG: 50 INJECTION, SOLUTION INTRAMUSCULAR; INTRAVENOUS at 11:33

## 2022-11-28 RX ADMIN — ROCURONIUM BROMIDE 10 MG: 50 INJECTION, SOLUTION INTRAVENOUS at 11:33

## 2022-11-28 RX ADMIN — Medication 1 MG: at 12:55

## 2022-11-28 RX ADMIN — KETAMINE HYDROCHLORIDE 40 MG: 50 INJECTION, SOLUTION INTRAMUSCULAR; INTRAVENOUS at 11:33

## 2022-11-28 RX ADMIN — KETOROLAC TROMETHAMINE 30 MG: 30 INJECTION, SOLUTION INTRAMUSCULAR; INTRAVENOUS at 12:37

## 2022-11-28 RX ADMIN — HYDROMORPHONE HYDROCHLORIDE 0.5 MG: 1 INJECTION, SOLUTION INTRAMUSCULAR; INTRAVENOUS; SUBCUTANEOUS at 14:05

## 2022-11-28 RX ADMIN — PROPOFOL 150 MG: 10 INJECTION, EMULSION INTRAVENOUS at 11:33

## 2022-11-28 RX ADMIN — MIDAZOLAM 2 MG: 1 INJECTION INTRAMUSCULAR; INTRAVENOUS at 10:56

## 2022-11-28 RX ADMIN — SUCRALFATE 1 G: 1 TABLET ORAL at 23:01

## 2022-11-28 RX ADMIN — SODIUM CHLORIDE, SODIUM LACTATE, POTASSIUM CHLORIDE, AND CALCIUM CHLORIDE: 600; 310; 30; 20 INJECTION, SOLUTION INTRAVENOUS at 12:14

## 2022-11-28 RX ADMIN — FENTANYL CITRATE 50 MCG: 50 INJECTION, SOLUTION INTRAMUSCULAR; INTRAVENOUS at 11:59

## 2022-11-28 RX ADMIN — HEPARIN SODIUM 5000 UNITS: 5000 INJECTION INTRAVENOUS; SUBCUTANEOUS at 10:54

## 2022-11-28 RX ADMIN — SUCRALFATE 1 G: 1 TABLET ORAL at 17:20

## 2022-11-28 RX ADMIN — GABAPENTIN 300 MG: 300 CAPSULE ORAL at 21:05

## 2022-11-28 RX ADMIN — POTASSIUM CHLORIDE AND SODIUM CHLORIDE: 900; 150 INJECTION, SOLUTION INTRAVENOUS at 17:21

## 2022-11-28 RX ADMIN — ROCURONIUM BROMIDE 20 MG: 50 INJECTION, SOLUTION INTRAVENOUS at 11:45

## 2022-11-28 RX ADMIN — HYDROMORPHONE HYDROCHLORIDE 0.5 MG: 1 INJECTION, SOLUTION INTRAMUSCULAR; INTRAVENOUS; SUBCUTANEOUS at 13:10

## 2022-11-28 RX ADMIN — ACETAMINOPHEN 1000 MG: 500 TABLET, FILM COATED ORAL at 17:20

## 2022-11-28 RX ADMIN — Medication 3 MG: at 12:50

## 2022-11-28 RX ADMIN — HYDROMORPHONE HYDROCHLORIDE 0.5 MG: 1 INJECTION, SOLUTION INTRAMUSCULAR; INTRAVENOUS; SUBCUTANEOUS at 13:15

## 2022-11-28 RX ADMIN — ROCURONIUM BROMIDE 10 MG: 50 INJECTION, SOLUTION INTRAVENOUS at 12:33

## 2022-11-28 RX ADMIN — ACETAMINOPHEN 1000 MG: 500 TABLET, FILM COATED ORAL at 23:01

## 2022-11-28 RX ADMIN — ROCURONIUM BROMIDE 20 MG: 50 INJECTION, SOLUTION INTRAVENOUS at 12:13

## 2022-11-28 RX ADMIN — HYDROMORPHONE HYDROCHLORIDE 0.5 MG: 1 INJECTION, SOLUTION INTRAMUSCULAR; INTRAVENOUS; SUBCUTANEOUS at 17:28

## 2022-11-28 RX ADMIN — ACETAMINOPHEN 1000 MG: 500 TABLET, FILM COATED ORAL at 10:52

## 2022-11-28 RX ADMIN — METOCLOPRAMIDE 10 MG: 5 INJECTION, SOLUTION INTRAMUSCULAR; INTRAVENOUS at 10:53

## 2022-11-28 RX ADMIN — LIDOCAINE HYDROCHLORIDE 60 MG: 20 INJECTION, SOLUTION EPIDURAL; INFILTRATION; INTRACAUDAL; PERINEURAL at 11:33

## 2022-11-28 RX ADMIN — FENTANYL CITRATE 50 MCG: 50 INJECTION, SOLUTION INTRAMUSCULAR; INTRAVENOUS at 12:28

## 2022-11-28 RX ADMIN — THIAMINE HYDROCHLORIDE: 100 INJECTION, SOLUTION INTRAMUSCULAR; INTRAVENOUS at 18:38

## 2022-11-28 RX ADMIN — SODIUM CHLORIDE, POTASSIUM CHLORIDE, SODIUM LACTATE AND CALCIUM CHLORIDE 125 ML/HR: 600; 310; 30; 20 INJECTION, SOLUTION INTRAVENOUS at 10:51

## 2022-11-28 RX ADMIN — SODIUM CHLORIDE, SODIUM LACTATE, POTASSIUM CHLORIDE, AND CALCIUM CHLORIDE: 600; 310; 30; 20 INJECTION, SOLUTION INTRAVENOUS at 11:24

## 2022-11-28 RX ADMIN — CEFAZOLIN SODIUM 2 G: 1 INJECTION, POWDER, FOR SOLUTION INTRAMUSCULAR; INTRAVENOUS at 11:24

## 2022-11-28 RX ADMIN — APREPITANT 40 MG: 40 CAPSULE ORAL at 10:52

## 2022-11-28 RX ADMIN — HYDROMORPHONE HYDROCHLORIDE 0.5 MG: 1 INJECTION, SOLUTION INTRAMUSCULAR; INTRAVENOUS; SUBCUTANEOUS at 14:00

## 2022-11-28 RX ADMIN — KETOROLAC TROMETHAMINE 30 MG: 30 INJECTION, SOLUTION INTRAMUSCULAR at 18:38

## 2022-11-28 RX ADMIN — HEPARIN SODIUM 5000 UNITS: 5000 INJECTION INTRAVENOUS; SUBCUTANEOUS at 21:05

## 2022-11-28 RX ADMIN — FENTANYL CITRATE 100 MCG: 50 INJECTION, SOLUTION INTRAMUSCULAR; INTRAVENOUS at 12:18

## 2022-11-28 RX ADMIN — OXYCODONE 5 MG: 5 TABLET ORAL at 17:20

## 2022-11-28 RX ADMIN — GLYCOPYRROLATE 0.6 MG: 0.2 INJECTION, SOLUTION INTRAMUSCULAR; INTRAVENOUS at 12:50

## 2022-11-28 ASSESSMENT — PAIN SCALES - GENERAL
PAINLEVEL_OUTOF10: 3
PAINLEVEL_OUTOF10: 6
PAINLEVEL_OUTOF10: 3
PAINLEVEL_OUTOF10: 4
PAINLEVEL_OUTOF10: 6
PAINLEVEL_OUTOF10: 6
PAINLEVEL_OUTOF10: 8
PAINLEVEL_OUTOF10: 6
PAINLEVEL_OUTOF10: 7
PAINLEVEL_OUTOF10: 6
PAINLEVEL_OUTOF10: 0
PAINLEVEL_OUTOF10: 3
PAINLEVEL_OUTOF10: 0
PAINLEVEL_OUTOF10: 8
PAINLEVEL_OUTOF10: 6

## 2022-11-28 ASSESSMENT — PAIN DESCRIPTION - LOCATION
LOCATION: ABDOMEN

## 2022-11-28 ASSESSMENT — PAIN DESCRIPTION - PAIN TYPE: TYPE: SURGICAL PAIN

## 2022-11-28 ASSESSMENT — PAIN - FUNCTIONAL ASSESSMENT: PAIN_FUNCTIONAL_ASSESSMENT: 0-10

## 2022-11-28 NOTE — OP NOTE
Operative Note      Patient: Bharat Mcgrath  YOB: 1995  MRN: [id number]    Date of Procedure: 11/28/2022    [facility] [E[facility]]    Post-Op Diagnosis: Same       Procedure(s):  ERAS/ GASTRECTOMY SLEEVE LAPAROSCOPIC ROBOTIC  EGD ESOPHAGOGASTRODUODENOSCOPY    Surgeon(s):  Bobby Carpio MD    Assistant:   * No surgical staff found *      Anesthesia: General    Estimated Blood Loss (mL): Minimal    Complications: None    Specimens:   ID Type Source Tests Collected by Time Destination   A : portion of stomach Tissue Stomach SURGICAL PATHOLOGY Bobby Carpio MD 11/28/2022 1239        Implants:  * No implants in log *      Drains: * No LDAs found *    Findings: Normal appearing intra-abdominal anatomy. Negative leak test. Normal appearing gastric and esophageal mucousa without staple line bleeding. Statement of Medical Necessity: Bharat Mcgrath is a 32 y.o. female who presented to the clinic with history of morbid obesity and Body mass index is 40.67 kg/m². Tanda Bun She failed multiple weight loss attempts meets the NIH criteria for obesity surgery. She decided for a laparoscopic vs open sleeve gastrectomy. We discussed specific risks of sleeve gastrectomy including but not limited to: pain, infection, bleeding, scar, excess skin, conversion to open approach, hernia, injury to adjacent organs, need for blood transfusion, thromboembolic complications, gastrointestinal leak, stricture, difficulty swallowing, need for revisional surgery, gastroesophageal reflux, esophagitis or esophageal cancer, need for revisional surgery, failure to lose weight or weight regain, nutritional deficiencies, heart attack, stroke, death. Patient understood and agreed to proceed. Procedure Details   After informed consent was taken, patient was taken to the operating room and placed in supine position with padding in all pressure points.  Anesthesia was induced and patient was intubated. Patient received preoperative antibiotics. Abdomen was prepped and draped in standard sterile fashion. A timeout was performed with all team members present. A 1 cm horizontal incision was made 17 cm from sternal notch to the left of the umbilicus. A Veress needle was inserted. Saline drop test was normal. The abdomen was insufflated with carbon dioxide to a pressure of 15 mmHg. The patient tolerated the insufflation well. A 8 mm robotic trocar was inserted bluntly. A general survey was performed and no injury was observed from trocar placement. A 12 mm robotic trocar was inserted about 10 cm to the left of the previously placed port. A 8 mm robotic trocar and a 8 mm Air Seal trocar were carefully placed under direct visualization in the left upper quadrant. A 5 mm tunnel was created distal and left lateral to the xiphoid, a Radha liver retractor advanced, and the liver retracted. A bilateral abdominal tap block was performed using Marcaine. The patient was placed in 22 degrees of reverse Trendelenberg and 6 degrees of right side tilt. The robot was docked. The robotic instruments were carefully inserted under direct visualization. A premeasured instrument was used to basilio the position 7 cm proximal to the pylorus along the greater curvature. The stomach was grasped and elevated. A Vessel sealer extend was used to divide the gastrocolic ligament and enter the lesser sac. The greater curvature of the stomach was divided free from its gastrocolic attachments in its entirety, to the level of the spleen. Next, a 41 Citizen of [place] blunt bougie was advanced along the lesser curvature to the pylorus. Using 6 sequential firings of DaVinci 60 mm smart stapler platform, green (1), blue (4) and white (1) endostaplers, the stomach was divided making sure to not leave any redundant fundus. The greater curvature segment was placed in the right upper quadrant of the abdomen. The bougie was withdrawn.  The lower part of the sleeve was fixated to the retroperitoneum using a 3-0 Vicryl suture to prevent twisting or kinking. An esophagogastroduodenoscopy advanced into the stomach. Using distal occlusion, insufflation of the stomach and immersion in sterile saline, a leak test was performed. No leak along the staple line was present. There was also no intraluminal bleeding or abnormalities of the stomach appreciated. The air was aspirated and the esophagogastroduodenoscopy withdrawn. The intraperitoneal saline was aspirated. Hemostasis was assured. All robotic instruments were removed and the robot was undocked. The liver retractor was removed under direct vision. The stomach was removed through the 12 mm port. This incision was closed at the level of the fascia with 0-vicryl sutures and an endoclosure device. The wounds were irrigated with betadine solution and closed with 4-0 Monocryl in a subcuticular fashion. Dermabond was applied to the skin incisions. All counts were reported as correct. The patient tolerated the procedure well and there were no immediate complications. Disposition: the patient was awakened from anesthesia and transferred to the PACU in stable condition.          Signed by: Wiliam Tate MD  [id number] [facility]  11/28/2022 12:57 PM

## 2022-11-28 NOTE — ANESTHESIA PRE PROCEDURE
Department of Anesthesiology  Preprocedure Note       Name:  Samina Parmar   Age:  32 y.o.  :  1995                                          MRN:  [id number]         Date:  2022      Surgeon: Zakiya Barrett):  Angel Espino MD    Procedure: Procedure(s):  ERAS/ GASTRECTOMY SLEEVE LAPAROSCOPIC ROBOTIC  EGD ESOPHAGOGASTRODUODENOSCOPY    Medications prior to admission:   Prior to Admission medications    Medication Sig Start Date End Date Taking? Authorizing Provider   sucralfate (CARAFATE) 1 GM tablet Take 1 tablet by mouth 4 times daily for 14 days Dissolve in water and drink. 10/4/22 10/18/22  SOHA Tam   omeprazole (PRILOSEC) 40 MG delayed release capsule Take 1 capsule by mouth every morning (before breakfast)  Patient not taking: Reported on 2022 10/4/22   SOHA Tam   ondansetron (ZOFRAN) 4 MG tablet Take 1 tablet by mouth 3 times daily as needed for Nausea or Vomiting  Patient not taking: Reported on 2022 10/4/22   SOHA Acevedo   methIMAzole (TAPAZOLE) 10 MG tablet Take 10 mg by mouth Five times weekly Monday- Friday only, Takes at night 22   Historical Provider, MD   norgestimate-ethinyl estradiol (ORTHO-CYCLEN) 0.25-35 MG-MCG per tablet Take 1 tablet by mouth at bedtime 22   Historical Provider, MD       Current medications:    No current facility-administered medications for this encounter.        Allergies:  No Known Allergies    Problem List:    Patient Active Problem List   Diagnosis Code    Obesity, Class III, BMI 40-49.9 (morbid obesity) [facility] E[facility]       Past Medical History:        Diagnosis Date    Graves disease        Past Surgical History:        Procedure Laterality Date    MEDIAL COLLATERAL LIGAMENT REPAIR, KNEE Right 2007    PATELLAR TENDON REPAIR Right        Social History:    Social History     Tobacco Use    Smoking status: Never    Smokeless tobacco: Never   Substance Use Topics    Alcohol use: Not Currently                                Counseling given: Not Answered      Vital Signs (Current):   Vitals:    11/26/22 1457 11/28/22 1014   Weight: 260 lb (117.9 kg) 259 lb 11.2 oz (117.8 kg)   Height: 5' 7\" (1.702 m) 5' 7\" (1.702 m)                                              BP Readings from Last 3 Encounters:   11/16/22 135/87   10/27/22 (!) 136/90   10/04/22 (!) 131/95       NPO Status:                                                                                 BMI:   Wt Readings from Last 3 Encounters:   11/28/22 259 lb 11.2 oz (117.8 kg)   11/16/22 260 lb 9.6 oz (118.2 kg)   10/27/22 257 lb (116.6 kg)     Body mass index is 40.67 kg/m². CBC:   Lab Results   Component Value Date/Time    WBC 14.6 11/16/2022 12:57 PM    RBC 4.60 11/16/2022 12:57 PM    HGB 12.4 11/16/2022 12:57 PM    HCT 37.6 11/16/2022 12:57 PM    MCV 81.7 11/16/2022 12:57 PM    RDW 13.2 11/16/2022 12:57 PM     11/16/2022 12:57 PM       CMP:   Lab Results   Component Value Date/Time     11/16/2022 12:57 PM    K 4.4 11/16/2022 12:57 PM     11/16/2022 12:57 PM    CO2 24 11/16/2022 12:57 PM    BUN 16 11/16/2022 12:57 PM    CREATININE 0.82 11/16/2022 12:57 PM    LABGLOM >60 11/16/2022 12:57 PM    GLUCOSE 86 11/16/2022 12:57 PM    CALCIUM 9.6 11/16/2022 12:57 PM       POC Tests: No results for input(s): POCGLU, POCNA, POCK, POCCL, POCBUN, POCHEMO, POCHCT in the last 72 hours.     Coags: No results found for: PROTIME, INR, APTT    HCG (If Applicable): No results found for: PREGTESTUR, PREGSERUM, HCG, HCGQUANT     ABGs: No results found for: PHART, PO2ART, XDD6FIO, BIY9XCZ, BEART, L4TEAPCH     Type & Screen (If Applicable):  No results found for: LABABO, LABRH    Drug/Infectious Status (If Applicable):  No results found for: HIV, HEPCAB    COVID-19 Screening (If Applicable): No results found for: COVID19        Anesthesia Evaluation  Patient summary reviewed and Nursing notes reviewed no history of anesthetic complications:   Airway: Mallampati: II  TM distance: >3 FB     Mouth opening: > = 3 FB   Dental: normal exam         Pulmonary:Negative Pulmonary ROS and normal exam                               Cardiovascular:Negative CV ROS  Exercise tolerance: good (>4 METS),                     Neuro/Psych:   Negative Neuro/Psych ROS              GI/Hepatic/Renal:   (+) GERD: well controlled, morbid obesity          Endo/Other:    (+) hyperthyroidism::., .                 Abdominal:             Vascular: negative vascular ROS. Other Findings:           Anesthesia Plan      general     ASA 3       Induction: intravenous. Anesthetic plan and risks discussed with patient, mother and father.                         Beryle Dash, MD   11/28/2022

## 2022-11-28 NOTE — DISCHARGE INSTRUCTIONS
Bariatric Surgery Discharge Instructions    Surgeon: Dr. Mera Crespo    Follow up: Follow up with your surgeon as previously scheduled in 1-2 weeks. [address]  Office number: [phone]    Diet:  When discharged from the hospital, you may begin clear and full liquid diet plus protein supplements  Start with clear liquids and progress to full liquids as tolerated  Goals: 60 grams of protein per day, 64 ounces of fluid per day  Avoid carbonated beverages and straws, avoid excessive air swallowing when drinking/eating, minimize caffeine intake. No alcohol. Wound care:  Surgical glue will flake off in 7-10 days; the edges of steri-strips may come up but leave them in place until your follow up appointment  May shower following surgery. It is okay to get soap and water on all wounds when showering. Do not soak wounds under water (bath, pool, hot tub) until healed about three weeks after surgery. Activity:  No lifting more than 20 lbs for 3-4 weeks. No repetitive abdominal straining (sit-ups, push-ups, crunches, pull-ups, squats), for 3 weeks. Okay to perform normal activities of daily living and walk up stairs. Walk daily. Continue deep breathing and use incentive spirometer at home. Return to school or work when you fee comfortable. Typically 1-2 weeks for a desk job or up to 4 weeks for a manual labor job. You may resume driving when you have minimal pain and are not taking narcotic pain medication. Medications: You will have been prescribed the following medications prior to discharge:  Percocet (5mg): take one pill by mouth every 4 hours as needed for pain  Zofran (8mg): take one pill by mouth every 8 hours for nausea or vomiting  Omeprazole (40mg): take one pill by mouth daily for 90 days  Use an over the counter stool softener (Miralax) or laxative (Ducolax, milk of magnesium) if you feel constipated.  You may not have a normal bowel movement being on a liquid diet. Do NOT take any NSAID medication (Ibuprofen, Aleve, Motrin, Naproxen, Celebrex, etc) after surgery  No nicotine in any form (cigarettes, vape, marijuana, chew, gum, patches)  It is acceptable to space multiple medications throughout the day as needed  Oral medications should be crushed if they are large; acceptable to take whole pills if they are small  You may resume home medications unless instructed otherwise    Notify your surgeon if. ..   Fever of 101.5 degrees F or 38 degrees C (by mouth)  Shortness of breath or difficulty breathing  Chest pain or very fast heart rate  Significant leg swelling and/or pain  Redness, swelling, foul-smelling drainage, bleeding or heat around your incisions  Persistent vomiting and/or inability to keep fluids down, lightheadedness  Significant abdominal bloating, swelling or pain

## 2022-11-28 NOTE — ANESTHESIA POSTPROCEDURE EVALUATION
Department of Anesthesiology  Postprocedure Note    Patient: Samina Parmar  MRN: [id number]  YOB: 1995  Date of evaluation: 11/28/2022      Procedure Summary     Date: 11/28/22 Room / Location: [facility] MAIN OR 07 / [facility] MAIN OR    Anesthesia Start: 1110 Anesthesia Stop: 9548    Procedures:       ERAS/ GASTRECTOMY SLEEVE LAPAROSCOPIC ROBOTIC (Abdomen)      EGD ESOPHAGOGASTRODUODENOSCOPY Diagnosis:       Morbid obesity ([facility]      (Morbid obesity ([facility] [E66.01])    Surgeons: Angel Espino MD Responsible Provider: Citlaly Foster MD    Anesthesia Type: General ASA Status: 3          Anesthesia Type: General    Farrukh Phase I:      Farrukh Phase II:        Anesthesia Post Evaluation    Patient location during evaluation: PACU  Patient participation: complete - patient participated  Level of consciousness: awake  Airway patency: patent  Nausea & Vomiting: no nausea  Complications: no  Cardiovascular status: hemodynamically stable  Respiratory status: acceptable and nonlabored ventilation  Hydration status: stable  Multimodal analgesia pain management approach

## 2022-11-28 NOTE — H&P
Emely Bocanegra MD   Bariatric & Advanced Laparoscopic Surgery & Endoscopy  [address] [place] [address]  Juliann Cadena  Phone [phone]   Fax [phone]      Date of visit: 2022      Primary/Requesting provider: Ambar Funez MD         Name: Quyen Ferrara      MRN: [id number]       : 1995       Age: 32 y.o. Sex: female        PCP: NOT ON FILE, MD     CC:    No chief complaint on file. Procedure: laparoscopic sleeve gastrectomy    Surgical Consult Date: 2022    Surgical Date: TBD     The patient is a 32 y.o. female presenting with a height of 5' 7\" (1.702 m) and weight of 263 lb (119.3 kg), giving her a Body mass index is 40.67 kg/m². She has an ideal body weight of 159 lbs, and excess body weight of 104 lbs. She started our program with a weight of 259 lbs, gaining 4 lbs. She has completed all aspects of our prep program and has been deemed an acceptable candidate for bariatric surgery. 30-Day Bariatric Surgical Risk Percentage: 3.87%    PSYCHOLOGICAL EVALUATION:   Completed, 2022 with Deedee Owen deeming her an appropriate surgical candidate. DIETITIAN EVALUATION:  Completed with Malvin Galo deeming her an appropriate surgical candidate. BARIATRIC LABS:  Completed, 2022 (A1C 5.7, vitamin D 17.0)    UPPER GI:  Completed, 2022  Unremarkable upper GI.    PHYSICAL THERAPY EVALUATION FOR MOBILITY OPTIMIZATION:  Completed, 2022    We have reviewed the procedure again today and completed our standard pre op teaching. Her questions were answered and she is ready to schedule surgery. We have discussed the procedure, diet and exercise regimens, and potential complications of surgery. The patient understands the nature and potential complication of surgery and has the capacity to follow the post operative diet, exercise, and nutritional requirements. After review, she has signed her surgical consent today. MEDICAL HISTORY:  Morbid Obesity  Anxiety  Hyperthyroidism  Grave's Disease - well controlled - Dr. David Krueger, [facility]     Comorbidity Yes or No   Hypertension No   Hyperlipidemia No   Diabetes Mellitus No   Coronary Artery Disease No   Gastroesophageal Reflux No   Obstructive Sleep Apnea No   Cancer No   Asthma No   Osteoarthritis No   Joint Pain Yes      Admits to rare GERD symptoms including heartburn with certain foods. Denies medication, previous EGD, or dysphagia. Other Yes or No   Venous Stasis No   Dialysis No   Long term use of steroid or immunocompromised conditions No   On Anticoagulants No      DENTAL/CHEWING ABILITY:  No dental issues with chewing. PRIOR WEIGHT LOSS ATTEMPTS:  Reports more than 15 previous weight loss attempts including Weight Watchers, Atkins, Herbalife, and low calorie dieting with a nutritionist.      EXERCISE ASSESSMENT:  Reports exercise including walking / jogging 3 days per week. Reviewed NIH guidelines. PSYCHOSOCIAL:  She notes she is single and states main support person is her mother, Nazia Louis. She is employed as a  at [employer] Her goal in pursuing surgical weight loss is to improve health. She denies any active psychological problems and reports appropriate treatment of anxiety. She states she is independent in her care, can drive a car, and can ambulate without assistance. Patient has a long term history of obesity with multiple failed attempts at weight reduction. Patient denies any changes in health history or physical health since last visit. Patient has been adherent to her diet and exercise regimen. Patient feels that she is well informed regarding her bariatric surgery choice and would like to proceed with a laparoscopic sleeve gastrectomy for weight reduction, improvement of her medical conditions, and improved quality of life. Patient verbalized understanding of the risks associated with bariatric surgery. Patient also verbalized understanding that bariatric surgery is a tool and that weight reduction is dependent on behavioral changes in regards to what she eats and how much.     PMH:    Past Medical History:   Diagnosis Date    Graves disease        PSH:    Past Surgical History:   Procedure Laterality Date    MEDIAL COLLATERAL LIGAMENT REPAIR, KNEE Right 12/07/2007    PATELLAR TENDON REPAIR Right 2020       MEDS:    Current Facility-Administered Medications   Medication Dose Route Frequency Provider Last Rate Last Admin    ceFAZolin (ANCEF) 2000 mg in sterile water 20 mL IV syringe  2,000 mg IntraVENous On Call to [address] SOHA Cross        lactated ringers infusion   IntraVENous Continuous SOHA Garrido        sodium chloride flush 0.9 % injection 5-40 mL  5-40 mL IntraVENous 2 times per day SOHA Goodman        sodium chloride flush 0.9 % injection 5-40 mL  5-40 mL IntraVENous PRN SOHA Metz        0.9 % sodium chloride infusion   IntraVENous PRN SOHA Metz        ondansetron (ZOFRAN) injection 4 mg  4 mg IntraVENous Once SOHA Goodman        scopolamine (TRANSDERM-SCOP) transdermal patch 1 patch  1 patch TransDERmal Q72H SOHA Garrido        aprepitant (EMEND) capsule 40 mg  40 mg Oral Once SOHA Goodman        heparin (porcine) injection 5,000 Units  5,000 Units SubCUTAneous Once SOHA Goodman        metoclopramide (REGLAN) injection 10 mg  10 mg IntraVENous Once SOHA Goodman        lidocaine 1 % injection 1 mL  1 mL IntraDERmal Once PRN Main Raymundo MD        acetaminophen (TYLENOL) tablet 1,000 mg  1,000 mg Oral Once Main Raymundo MD        fentaNYL (SUBLIMAZE) injection 100 mcg  100 mcg IntraVENous Once PRN Main Raymundo MD        lactated ringers infusion   IntraVENous Continuous Main Raymundo MD        sodium chloride flush 0.9 % injection 5-40 mL  5-40 mL IntraVENous 2 times per day Main Raymundo MD        sodium chloride flush 0.9 % injection 5-40 mL  5-40 mL IntraVENous PRN Michele Kussmaul, MD        0.9 % sodium chloride infusion   IntraVENous PRN Michele Kussmaul, MD        midazolam PF (VERSED) injection 2 mg  2 mg IntraVENous Once PRN Michele Kussmaul, MD           ALLERGIES:      No Known Allergies    SH:    Social History     Tobacco Use    Smoking status: Never    Smokeless tobacco: Never   Vaping Use    Vaping Use: Never used   Substance Use Topics    Alcohol use: Not Currently    Drug use: Not Currently       FH:    Family History   Problem Relation Age of Onset    Diabetes Maternal Grandmother           Physical Exam:     /81   Pulse 98   Temp 97.2 °F (36.2 °C) (Temporal)   Resp 18   Ht 5' 7\" (1.702 m)   Wt 259 lb 11.2 oz (117.8 kg)   LMP 11/02/2022   SpO2 97%   BMI 40.67 kg/m²     General:  Well-developed, well-nourished, no distress. Psych:  Cooperative, good insight and judgement. Neuro:  Alert, oriented to person, place and time. HEENT:  Normocephalic, atraumatic. Sclera clear. Lungs:  Unlabored breathing. Symmetrical chest expansion. Chest wall:  No tenderness or deformity. Heart:  Regular rate and rhythm. No JVD. Abdomen:  Soft, obese, non-tender, non-distended. No guarding or rebound. No palpable masses. Extremities:  Extremities normal, atraumatic, no cyanosis or edema. Skin:  Skin color, texture, turgor normal. No rashes. Labs: All recent labs were reviewed. Imaging: All images were independently reviewed by me. Diagnosis Orders   1. Morbid obesity ([address]        2. Obesity, Class III, BMI 40-49.9 (morbid obesity) ([address]        3. Hx of gastroesophageal reflux (GERD)  sucralfate (CARAFATE) 1 GM tablet    omeprazole (PRILOSEC) 40 MG delayed release capsule      4. Hyperthyroidism        5. Graves disease        6. Post-operative pain  oxyCODONE-acetaminophen (PERCOCET) 5-325 MG per tablet      7.  Post-operative nausea and vomiting  ondansetron (ZOFRAN) 4 MG tablet          Assessment/Plan:  Violet Uribe is a 32 y.o. female is here her preoperative visit prior to bariatric surgery. 1. Patient is an excellent candidate for bariatric surgery and meets NIH criteria for weight loss surgery. Patient has clear medical necessity for bariatric surgery. Patient is well informed, motivated, and has a strong desire for weight loss. 2. In detail, discussed risks and benefits of sleeve gastrectomy. The gastric sleeve procedure is performed utilizing a stapler to remove a portion of the stomach, creating a smaller, banana-shaped tube. Intraoperative EGD will be performed during the procedure and is considered medically necessary to rule out concerns with leak, bleeding, and/or obstruction. We discussed specific risks of sleeve gastrectomy including but not limited to: pain, infection, bleeding, scar, excess skin, conversion to open approach, hernia, injury to adjacent organs, need for blood transfusion, thromboembolic complications, gastrointestinal leak, stricture, difficulty swallowing, need for revisional surgery, gastroesophageal reflux, esophagitis or esophageal cancer, need for revisional surgery, failure to lose weight or weight regain, nutritional deficiencies, heart attack, stroke, death.          Signed: Raleigh Bustamante MD  Bariatric & Minimally Invasive Surgery  11/28/2022

## 2022-11-29 VITALS
DIASTOLIC BLOOD PRESSURE: 96 MMHG | OXYGEN SATURATION: 97 % | TEMPERATURE: 97.9 F | HEIGHT: 67 IN | HEART RATE: 80 BPM | WEIGHT: 266.2 LBS | SYSTOLIC BLOOD PRESSURE: 141 MMHG | RESPIRATION RATE: 17 BRPM | BODY MASS INDEX: 41.78 KG/M2

## 2022-11-29 LAB
ANION GAP SERPL CALC-SCNC: 7 MMOL/L (ref 2–11)
BASOPHILS # BLD: 0 K/UL (ref 0–0.2)
BASOPHILS NFR BLD: 0 % (ref 0–2)
BUN SERPL-MCNC: 7 MG/DL (ref 6–23)
CALCIUM SERPL-MCNC: 8.2 MG/DL (ref 8.3–10.4)
CHLORIDE SERPL-SCNC: 111 MMOL/L (ref 101–110)
CO2 SERPL-SCNC: 21 MMOL/L (ref 21–32)
CREAT SERPL-MCNC: 0.85 MG/DL (ref 0.6–1)
DIFFERENTIAL METHOD BLD: ABNORMAL
EOSINOPHIL # BLD: 0 K/UL (ref 0–0.8)
EOSINOPHIL NFR BLD: 0 % (ref 0.5–7.8)
ERYTHROCYTE [DISTWIDTH] IN BLOOD BY AUTOMATED COUNT: 13.6 % (ref 11.9–14.6)
ERYTHROCYTE [DISTWIDTH] IN BLOOD BY AUTOMATED COUNT: 13.6 % (ref 11.9–14.6)
GLUCOSE SERPL-MCNC: 97 MG/DL (ref 65–100)
HCT VFR BLD AUTO: 30.3 % (ref 35.8–46.3)
HCT VFR BLD AUTO: 31.9 % (ref 35.8–46.3)
HGB BLD-MCNC: 10.1 G/DL (ref 11.7–15.4)
HGB BLD-MCNC: 10.5 G/DL (ref 11.7–15.4)
IMM GRANULOCYTES # BLD AUTO: 0 K/UL (ref 0–0.5)
IMM GRANULOCYTES NFR BLD AUTO: 0 % (ref 0–5)
LYMPHOCYTES # BLD: 2.3 K/UL (ref 0.5–4.6)
LYMPHOCYTES NFR BLD: 23 % (ref 13–44)
MCH RBC QN AUTO: 27 PG (ref 26.1–32.9)
MCH RBC QN AUTO: 27.4 PG (ref 26.1–32.9)
MCHC RBC AUTO-ENTMCNC: 32.9 G/DL (ref 31.4–35)
MCHC RBC AUTO-ENTMCNC: 33.3 G/DL (ref 31.4–35)
MCV RBC AUTO: 82 FL (ref 82–102)
MCV RBC AUTO: 82.1 FL (ref 82–102)
MONOCYTES # BLD: 0.7 K/UL (ref 0.1–1.3)
MONOCYTES NFR BLD: 7 % (ref 4–12)
NEUTS SEG # BLD: 7.2 K/UL (ref 1.7–8.2)
NEUTS SEG NFR BLD: 70 % (ref 43–78)
NRBC # BLD: 0 K/UL (ref 0–0.2)
NRBC # BLD: 0 K/UL (ref 0–0.2)
PLATELET # BLD AUTO: 296 K/UL (ref 150–450)
PLATELET # BLD AUTO: 304 K/UL (ref 150–450)
PMV BLD AUTO: 10 FL (ref 9.4–12.3)
PMV BLD AUTO: 10.2 FL (ref 9.4–12.3)
POTASSIUM SERPL-SCNC: 4 MMOL/L (ref 3.5–5.1)
RBC # BLD AUTO: 3.69 M/UL (ref 4.05–5.2)
RBC # BLD AUTO: 3.89 M/UL (ref 4.05–5.2)
SODIUM SERPL-SCNC: 139 MMOL/L (ref 133–143)
WBC # BLD AUTO: 10.2 K/UL (ref 4.3–11.1)
WBC # BLD AUTO: 10.6 K/UL (ref 4.3–11.1)

## 2022-11-29 PROCEDURE — 6360000002 HC RX W HCPCS: Performed by: PHYSICIAN ASSISTANT

## 2022-11-29 PROCEDURE — 2580000003 HC RX 258: Performed by: PHYSICIAN ASSISTANT

## 2022-11-29 PROCEDURE — APPSS30 APP SPLIT SHARED TIME 16-30 MINUTES: Performed by: PHYSICIAN ASSISTANT

## 2022-11-29 PROCEDURE — C9113 INJ PANTOPRAZOLE SODIUM, VIA: HCPCS | Performed by: PHYSICIAN ASSISTANT

## 2022-11-29 PROCEDURE — 85025 COMPLETE CBC W/AUTO DIFF WBC: CPT

## 2022-11-29 PROCEDURE — 85027 COMPLETE CBC AUTOMATED: CPT

## 2022-11-29 PROCEDURE — 80048 BASIC METABOLIC PNL TOTAL CA: CPT

## 2022-11-29 PROCEDURE — 97530 THERAPEUTIC ACTIVITIES: CPT

## 2022-11-29 PROCEDURE — A4216 STERILE WATER/SALINE, 10 ML: HCPCS | Performed by: PHYSICIAN ASSISTANT

## 2022-11-29 PROCEDURE — 36415 COLL VENOUS BLD VENIPUNCTURE: CPT

## 2022-11-29 PROCEDURE — 6370000000 HC RX 637 (ALT 250 FOR IP): Performed by: PHYSICIAN ASSISTANT

## 2022-11-29 PROCEDURE — 97161 PT EVAL LOW COMPLEX 20 MIN: CPT

## 2022-11-29 RX ADMIN — POTASSIUM CHLORIDE AND SODIUM CHLORIDE: 900; 150 INJECTION, SOLUTION INTRAVENOUS at 03:52

## 2022-11-29 RX ADMIN — SUCRALFATE 1 G: 1 TABLET ORAL at 05:00

## 2022-11-29 RX ADMIN — KETOROLAC TROMETHAMINE 30 MG: 30 INJECTION, SOLUTION INTRAMUSCULAR at 06:27

## 2022-11-29 RX ADMIN — SODIUM CHLORIDE 40 MG: 9 INJECTION, SOLUTION INTRAMUSCULAR; INTRAVENOUS; SUBCUTANEOUS at 09:24

## 2022-11-29 RX ADMIN — ACETAMINOPHEN 1000 MG: 500 TABLET, FILM COATED ORAL at 11:43

## 2022-11-29 RX ADMIN — OXYCODONE 5 MG: 5 TABLET ORAL at 15:41

## 2022-11-29 RX ADMIN — OXYCODONE 5 MG: 5 TABLET ORAL at 09:25

## 2022-11-29 RX ADMIN — SUCRALFATE 1 G: 1 TABLET ORAL at 11:43

## 2022-11-29 RX ADMIN — ACETAMINOPHEN 1000 MG: 500 TABLET, FILM COATED ORAL at 05:00

## 2022-11-29 RX ADMIN — SUCRALFATE 1 G: 1 TABLET ORAL at 17:08

## 2022-11-29 RX ADMIN — ACETAMINOPHEN 1000 MG: 500 TABLET, FILM COATED ORAL at 17:08

## 2022-11-29 RX ADMIN — GABAPENTIN 300 MG: 300 CAPSULE ORAL at 15:41

## 2022-11-29 RX ADMIN — GABAPENTIN 300 MG: 300 CAPSULE ORAL at 09:24

## 2022-11-29 RX ADMIN — HEPARIN SODIUM 5000 UNITS: 5000 INJECTION INTRAVENOUS; SUBCUTANEOUS at 05:00

## 2022-11-29 RX ADMIN — KETOROLAC TROMETHAMINE 30 MG: 30 INJECTION, SOLUTION INTRAMUSCULAR at 00:48

## 2022-11-29 ASSESSMENT — PAIN DESCRIPTION - PAIN TYPE: TYPE: SURGICAL PAIN

## 2022-11-29 ASSESSMENT — PAIN SCALES - GENERAL
PAINLEVEL_OUTOF10: 3
PAINLEVEL_OUTOF10: 6
PAINLEVEL_OUTOF10: 2
PAINLEVEL_OUTOF10: 3
PAINLEVEL_OUTOF10: 4
PAINLEVEL_OUTOF10: 6

## 2022-11-29 ASSESSMENT — PAIN DESCRIPTION - LOCATION
LOCATION: ABDOMEN
LOCATION: ABDOMEN;INCISION

## 2022-11-29 ASSESSMENT — PAIN DESCRIPTION - DESCRIPTORS: DESCRIPTORS: SORE

## 2022-11-29 NOTE — PROGRESS NOTES
PHYSICAL THERAPY Initial Assessment, Discharge, and AM  (Link to Caseload Tracking: PT Visit Days : 1  Acknowledge Orders  Time In/Out  PT Charge Capture  Rehab Caseload Tracker    Quyen Ferrara is a 32 y.o. female   PRIMARY DIAGNOSIS: Obesity, Class III, BMI 40-49.9 (morbid obesity) ([facility]  Morbid obesity ([facility] [E66.01]  Procedure(s) (LRB):  ERAS/ GASTRECTOMY SLEEVE LAPAROSCOPIC ROBOTIC (N/A)  EGD ESOPHAGOGASTRODUODENOSCOPY (N/A)  1 Day Post-Op  Reason for Referral: Generalized Muscle Weakness (M62.81)  Other abnormalities of gait and mobility (R26.89)  Inpatient: Payor: Mansoor Harding / Plan: Vidal Garza / Product Type: *No Product type* /     ASSESSMENT:     REHAB RECOMMENDATIONS:   Recommendation to date pending progress:  Setting:  No further skilled therapy after discharge from hospital    Equipment:    None     ASSESSMENT:  Ms. Max Oro pt s/p sleeve gastrectomy on 11/28/22. Pt up ambulating in hallway upon arrival this AM.  Pt ambulated an additional 150 ft post PT arrival, pt reports she has ambulated in hallway several times this AM and yesterday. Reviewed importance of continued ambulation and HEP, pt verbalized understanding and agreed. Pt with no additional acute PT needs at this time, discharge to [facility]. Pt with anticipated discharge home later today.      [facility] [address] [address] AM-PAC 6 Clicks Basic Mobility Inpatient Short Form  AM-PAC Mobility Inpatient   How much difficulty turning over in bed?: None  How much difficulty sitting down on / standing up from a chair with arms?: None  How much difficulty moving from lying on back to sitting on side of bed?: None  How much help from another person moving to and from a bed to a chair?: None  How much help from another person needed to walk in hospital room?: None  How much help from another person for climbing 3-5 steps with a railing?: None  AM-PAC Inpatient Mobility Raw Score : 24  AM-PAC Inpatient T-Scale Score : 61.14  Mobility Inpatient CMS 0-100% Score: 0  Mobility Inpatient CMS G-Code Modifier : CH    SUBJECTIVE:   Ms. Max Oro states, \"I just want to maximize my recovery time\" Pt agreeable to PT      Social/Functional Lives With: Significant other  Type of Home: Apartment  Bathroom Equipment:  (none)  Home Equipment:  (none)  ADL Assistance: Independent  Ambulation Assistance: Independent  Mode of Transportation: Car  Additional Comments: Pt has rented an AirBnB for this week as she lives out of town, pt reports 1L, no stairs. OBJECTIVE:     PAIN: VITALS / O2: PRECAUTION / Evalene Schillings / DRAINS:   Pre Treatment:   Pain Level:  (pain not rated)  Pain Location: Abdomen; Incision  Pain Descriptors: Sore  Pain Type: Surgical pain  Response to Pain Intervention: Patient satisfied (pt reports tolerable pain, nsg aware)      Post Treatment: Pt no c/o increased pain Vitals        Oxygen      IV    RESTRICTIONS/PRECAUTIONS:                    GROSS EVALUATION: Intact Impaired (Comments):   AROM [x]     PROM [x]    Strength [x]     Balance [x]     Posture [x] WFL   Sensation []     Coordination []      Tone []     Edema []    Activity Tolerance [x] Patient Tolerated treatment well    []      COGNITION/  PERCEPTION: Intact Impaired (Comments):   Orientation [x]     Vision [x]     Hearing [x]     Cognition  [x]       MOBILITY: I Mod I S SBA CGA Min Mod Max Total  NT x2 Comments:   Bed Mobility    Rolling [] [] [] [] [] [] [] [] [] [] []    Supine to Sit [] [] [] [] [] [] [] [] [] [] []    Scooting [] [] [] [] [] [] [] [] [] [] []    Sit to Supine [] [] [] [] [] [] [] [] [] [] []    Transfers    Sit to Stand [x] [] [] [] [] [] [] [] [] [] []    Bed to Chair [] [] [] [] [] [] [] [] [] [] []    Stand to Sit [x] [] [] [] [] [] [] [] [] [] []     [] [] [] [] [] [] [] [] [] [] []    I=Independent, Mod I=Modified Independent, S=Supervision, SBA=Standby Assistance, CGA=Contact Guard Assistance,   Min=Minimal Assistance, Mod=Moderate Assistance, Max=Maximal Assistance, Total=Total Assistance, NT=Not Tested    GAIT: I Mod I S SBA CGA Min Mod Max Total  NT x2 Comments:   Level of Assistance [x] [] [] [] [] [] [] [] [] [] []    Distance 150 feet    DME Pushing IV pole    Gait Quality Decreased chucho     Weightbearing Status      Stairs      I=Independent, Mod I=Modified Independent, S=Supervision, SBA=Standby Assistance, CGA=Contact Guard Assistance,   Min=Minimal Assistance, Mod=Moderate Assistance, Max=Maximal Assistance, Total=Total Assistance, NT=Not Tested    PLAN:     FREQUENCY AND DURATION: Pt with no additional acute PT needs at this time, discharge pt from acute PT, pt to continue with ambulation in hallway and [facility] per protocol. TREATMENT:   EVALUATION: LOW COMPLEXITY: (Untimed Charge)    TREATMENT:   Therapeutic Activity (10 Minutes): Therapeutic activity included Transfer Training, Ambulation on level ground, Sitting balance , and Standing balance to improve functional Activity tolerance, Balance, Mobility, and Strength. TREATMENT GRID:  N/A    AFTER TREATMENT PRECAUTIONS: Bed, Bed/Chair Locked, Call light within reach, Needs within reach, RN notified, Visitors at bedside, and pt left sitting edge of bed    INTERDISCIPLINARY COLLABORATION:  RN/ PCT    EDUCATION: Education Given To: Patient; Family  Education Provided: Role of Therapy;Home Exercise Program  Education Method: Verbal  Barriers to Learning: None  Education Outcome: Demonstrated understanding;Verbalized understanding    TIME IN/OUT:  Time In: 8525  Time Out: [address]  Minutes: [address], [address] [facility]

## 2022-11-29 NOTE — CARE COORDINATION
11/29/22 0847   Service Assessment   Patient Orientation Alert and Oriented   Cognition Alert   History Provided By Patient   Primary Caregiver Self   Support Systems Spouse/Significant Other;Parent   PCP Verified by CM Yes  (She stated she does not have a PCP but has an Endocrinologist)   Prior Functional Level Independent in ADLs/IADLs   Current Functional Level Independent in ADLs/IADLs  (She stated she ambulated in the hallway three times yesterday.)   Can patient return to prior living arrangement Yes   Ability to make needs known: Good   Family able to assist with home care needs: Yes   Would you like for me to discuss the discharge plan with any other family members/significant others, and if so, who? Yes  (parent)   Financial Resources Other (Comment)  (BCBS)   Community Resources Other (Comment)  (None)   Social/Functional History   Lives With Significant other   Type of Home Apartment   Bathroom Equipment   (none)   Home Equipment   (none)   ADL Assistance Independent   Mode of Transportation Car   Discharge Planning   Type of [facility] Spouse/Significant Other   Current Services Prior To Admission None   Potential Assistance Needed N/A   DME Ordered? No   Potential Assistance Purchasing Medications No   Type of Home Care Services None   Patient expects to be discharged to: Other (comment)  (She is renting an AirBnB in the [facility] for this week since she does not live locally.)   [address] [facility] Discharge None     RN CM met with the patient at the bedside and discussed discharge planning. Her significant other was at the bedside. She stated she lives in an apartment with her significant other. She stated she is independent of ADLs and does not use DMEs or external services. RN CM encouraged her to ask questions. She verbalized understanding and agreement with the discharge plan. No case management needs were identified.

## 2022-11-29 NOTE — PROGRESS NOTES
Bariatric Surgery Daily Progress Note    Patient: Silvio Guaman  MRN: [id number]  Date: 11/29/2022 7:34 AM  Admit Date: 11/28/2022  Procedure: robotic assisted sleeve gastrectomy    Subjective:     Silvio Guaman is a 32 y.o. female who is POD #1 s/p sleeve gastrectomy completed by Dr. Ary Ugalde. She is doing well and feeling better this morning. She admits to moderate right sided abdominal pain that is worse with movement, and did experience some gas pain this morning which improved with ambulation. She denies nausea, vomiting or dry heaving. She has been OOB ambulating the halls, voiding without difficulty, has not been using the incentive spirometer, and is tolerating some PO intake including protein shakes.     Review of Systems   General ROS: negative for - fever or chills  Psychological ROS: negative for - memory difficulties  Ophthalmic ROS: negative for - blurry vision or double vision  Respiratory ROS: negative for - cough, shortness of breath, or wheezing  Cardiovascular ROS: negative for - chest pain  Gastrointestinal ROS: negative for - nausea, vomiting or dry heaving  Musculoskeletal ROS: negative for - pain in the lower extremities  Neurological ROS: negative for - dizziness, light headedness, headaches or numbness     Objective:     MEDS:    Current Facility-Administered Medications   Medication Dose Route Frequency Provider Last Rate Last Admin    acetaminophen (TYLENOL) tablet 1,000 mg  1,000 mg Oral Q6H SOHA Garrido   1,000 mg at 11/29/22 0500    diphenhydrAMINE (BENADRYL) capsule 25 mg  25 mg Oral Q6H PRN SOHA Corcoran        Or    diphenhydrAMINE (BENADRYL) injection 25 mg  25 mg IntraVENous Q6H PRN SOHA Corcoran        pantoprazole (PROTONIX) 40 mg in sodium chloride (PF) 0.9 % 10 mL injection  40 mg IntraVENous Daily SOHA Garrido        0.9% NaCl with KCl 20 mEq infusion   IntraVENous Continuous SOHA Corcoran 125 mL/hr at 11/29/22 0352 New Bag at 11/29/22 0352    sodium chloride flush 0.9 % injection 5-40 mL  5-40 mL IntraVENous 2 times per day SOHA Krause        sodium chloride flush 0.9 % injection 5-40 mL  5-40 mL IntraVENous PRN SOHA Phoenix        0.9 % sodium chloride infusion   IntraVENous PRN SOHA Krause        oxyCODONE (ROXICODONE) immediate release tablet 5 mg  5 mg Oral Q4H PRN SOHA Krause   5 mg at 11/28/22 1720    HYDROmorphone (DILAUDID) injection 0.5 mg  0.5 mg IntraVENous Q3H PRN SOHA Krause   0.5 mg at 11/28/22 1728    ondansetron (ZOFRAN) injection 4 mg  4 mg IntraVENous Q6H PRN SOHA Phoenix        prochlorperazine (COMPAZINE) injection 10 mg  10 mg IntraVENous Q6H PRN SOHA Krause        scopolamine (TRANSDERM-SCOP) transdermal patch 1 patch  1 patch TransDERmal Q72H SOHA Krause   1 patch at 11/28/22 1051    lidocaine 2000 mg in dextrose 5% 500 mL infusion  1 mg/kg/hr (Ideal) IntraVENous Continuous SOHA Phoenix 15.4 mL/hr at 11/28/22 1628 1 mg/kg/hr at 11/28/22 1628    hydrALAZINE (APRESOLINE) injection 10 mg  10 mg IntraVENous Q6H PRN SOHA Phoenix        gabapentin (NEURONTIN) capsule 300 mg  300 mg Oral TID SOHA Krause   300 mg at 11/28/22 2105    simethicone (MYLICON) chewable tablet 80 mg  80 mg Oral Q6H PRN SOHA Phoenix        sucralfate (CARAFATE) tablet 1 g  1 g Oral 4 times per day SOHA Krause   1 g at 11/29/22 0500       ALLERGIES:       No Known Allergies    I/O:      Intake/Output Summary (Last 24 hours) at 11/29/2022 0734  Last data filed at 11/29/2022 0200  Gross per 24 hour   Intake 1007.36 ml   Output 900 ml   Net 107.36 ml       Physical Examination:     /85   Pulse 61   Temp 97.3 °F (36.3 °C) (Oral)   Resp 16   Ht 5' 7\" (1.702 m)   Wt 266 lb 3.2 oz (120.7 kg)   LMP 11/02/2022   SpO2 97%   BMI 41.69 kg/m²     General:  Alert, oriented, cooperative in no acute distress. Neuro:  Alert, oriented to person, place and time. Lungs:  Unlabored breathing. Symmetrical chest expansion. Heart: Regular rate and rhythm. Abdomen:  Soft, appropriately tender at incision sites. Lap incisions C/D/I without presence of erythema, infection, or allergic reaction. Abdominal binder in place. Extremities:  Extremities normal, atraumatic, no cyanosis or edema. Labs / Imaging / Diagnostics:     Labs: All recent labs were reviewed.    Recent Results (from the past 24 hour(s))   POC Pregnancy Urine Qual    Collection Time: 11/28/22 10:43 AM   Result Value Ref Range    Preg Test, Ur Negative NEG     TYPE AND SCREEN    Collection Time: 11/28/22 10:46 AM   Result Value Ref Range    Crossmatch expiration date 12/01/2022,2359     ABO/Rh A POSITIVE     Antibody Screen NEG    Basic Metabolic Panel    Collection Time: 11/29/22  6:09 AM   Result Value Ref Range    Sodium 139 133 - 143 mmol/L    Potassium 4.0 3.5 - 5.1 mmol/L    Chloride 111 (H) 101 - 110 mmol/L    CO2 21 21 - 32 mmol/L    Anion Gap 7 2 - 11 mmol/L    Glucose 97 65 - 100 mg/dL    BUN 7 6 - 23 MG/DL    Creatinine 0.85 0.6 - 1.0 MG/DL    Est, Glom Filt Rate >60 >60 ml/min/1.73m2    Calcium 8.2 (L) 8.3 - 10.4 MG/DL   CBC with Auto Differential    Collection Time: 11/29/22  6:09 AM   Result Value Ref Range    WBC 10.2 4.3 - 11.1 K/uL    RBC 3.89 (L) 4.05 - 5.2 M/uL    Hemoglobin 10.5 (L) 11.7 - 15.4 g/dL    Hematocrit 31.9 (L) 35.8 - 46.3 %    MCV 82.0 82.0 - 102.0 FL    MCH 27.0 26.1 - 32.9 PG    MCHC 32.9 31.4 - 35.0 g/dL    RDW 13.6 11.9 - 14.6 %    Platelets 491 718 - 933 K/uL    MPV 10.2 9.4 - 12.3 FL    nRBC 0.00 0.0 - 0.2 K/uL    Differential Type AUTOMATED      Seg Neutrophils 70 43 - 78 %    Lymphocytes 23 13 - 44 %    Monocytes 7 4.0 - 12.0 %    Eosinophils % 0 (L) 0.5 - 7.8 %    Basophils 0 0.0 - 2.0 %    Immature Granulocytes 0 0.0 - 5.0 %    Segs Absolute 7.2 1.7 - 8.2 K/UL    Absolute Lymph # 2.3 0.5 - 4.6 K/UL    Absolute Mono # 0.7 0.1 - 1.3 K/UL    Absolute Eos # 0.0 0.0 - 0.8 K/UL    Basophils Absolute 0.0 0.0 - 0.2 K/UL    Absolute Immature Granulocyte 0.0 0.0 - 0.5 K/UL       Imaging: No images were independently reviewed. Assessment / Plan: Active Hospital Problems    Diagnosis Date Noted    Morbid obesity ([facility] [E66.01] 11/28/2022     Priority: Medium    Obesity, Class III, BMI 40-49.9 (morbid obesity) ([facility] [E66.01] 06/27/2022     Priority: Juan Calzada is a 32 y.o. female s/p robotic assisted sleeve gastrectomy    Pain control   2. Bariatric diet - clear liquids, Ensure Max protein  3. Lap incisions C/D/I  4. No tachycardia overnight   5. Labs reviewed. WBC 10.2. Hgb stable 10.5 from 12.4.  6.   OOB and ambulate as tolerated. PT consulted. 7.   DVT prophylaxis - SCDs, hold heparin this morning  8. Abdominal binder for comfort  9. Repeat H&H this morning    Discharge: to home likely later today    Huy Lucero PA-C  Date: 11/29/2022    Counseling time:counseling time more than 50% of visit: 25 minutes : I spent this time preparing to see patient (including chart review and preparation), obtaining and/or reviewing additional medical history, performing a physical exam and evaluation, documenting clinical information in the electronic health record, independently interpreting results, communicating results to patient, family or caregiver, and/or coordinating care.

## 2022-11-29 NOTE — PLAN OF CARE
Problem: Pain  Goal: Verbalizes/displays adequate comfort level or baseline comfort level  Outcome: Adequate for Discharge     Problem: Discharge Planning  Goal: Discharge to home or other facility with appropriate resources  Outcome: Adequate for Discharge     Problem: ABCDS Injury Assessment  Goal: Absence of physical injury  Outcome: Adequate for Discharge     Problem: Skin/Tissue Integrity - Adult  Goal: Skin integrity remains intact  Outcome: Adequate for Discharge  Goal: Incisions, wounds, or drain sites healing without S/S of infection  Outcome: Adequate for Discharge     Problem: Gastrointestinal - Adult  Goal: Minimal or absence of nausea and vomiting  Outcome: Adequate for Discharge  Goal: Maintains or returns to baseline bowel function  Outcome: Adequate for Discharge  Goal: Maintains adequate nutritional intake  Outcome: Adequate for Discharge

## 2022-11-29 NOTE — PLAN OF CARE
Problem: Pain  Goal: Verbalizes/displays adequate comfort level or baseline comfort level  Outcome: Progressing     Problem: Discharge Planning  Goal: Discharge to home or other facility with appropriate resources  Outcome: Progressing     Problem: ABCDS Injury Assessment  Goal: Absence of physical injury  Outcome: Progressing     Problem: Skin/Tissue Integrity - Adult  Goal: Skin integrity remains intact  Outcome: Progressing  Goal: Incisions, wounds, or drain sites healing without S/S of infection  Outcome: Progressing     Problem: Gastrointestinal - Adult  Goal: Minimal or absence of nausea and vomiting  Outcome: Progressing  Goal: Maintains or returns to baseline bowel function  Outcome: Progressing  Goal: Maintains adequate nutritional intake  Outcome: Progressing

## 2022-11-30 NOTE — PROGRESS NOTES
Linda Pereyra PA-C  Bariatric & Advanced Laparoscopic Surgery & Endoscopy  [address] [place] [address]  Juliann Cadena  Phone [phone]   Fax [phone]    Date of visit: 2022          Name: Dexter Silvestre      MRN: [id number]       : 1995       Age: 32 y.o. Sex: female        PCP: NOT ON FILE, MD     Surgeon: Dr. Mera Crespo  Procedure: robotic assisted sleeve gastrectomy    HPI:    1 week post-op visit after a robotic assisted sleeve gastrectomy was done on 2022. She has lost 7lbs since her last office visit. Weight History Graph  Post-Surgical Weight Loss  Date: 22  Height: 5' 7\" (170.2 cm)  Weight: 250 lb (113.4 kg)  BMI: 39.15  Weight Change: -7 lbs  Total Weight Change: -7 lbs  % EBWL: 7%    Evaluation of Pre-operative Co-morbid Conditions:    None    Clinical Assessment and Physical Exam:    She is doing very well today and is feeling good. She reports that she has been adhering to the liquid diet without difficulty. She admits to improving right sided abdominal pain at the incision site. She also experiences occasional nausea in the morning. She denies any vomiting or heartburn. She denies fevers or chills, or any redness or drainage from the incision sites. She does report having problems with constipation for which he is taking Ducolax. Her pain is well controlled and is not taking pain medications. She has been taking the PPI without difficulty. She has been doing her protein shakes without difficulty. Protein:  60+ grams per day  Fluids:    64+ ounces per day  Exercise:  walking daily as tolerated  Denies reflux. Denies dysphagia. Tolerating Protein first diet without difficulty.     PMH:  Past Medical History:   Diagnosis Date    Graves disease        PSH:  Past Surgical History:   Procedure Laterality Date    MEDIAL COLLATERAL LIGAMENT REPAIR, KNEE Right 2007    PATELLAR TENDON REPAIR Right     SLEEVE GASTRECTOMY N/A 11/28/2022    ERAS/ GASTRECTOMY SLEEVE LAPAROSCOPIC ROBOTIC performed by Jacob Bailey MD at [address] N/A 11/28/2022    [facility]  Current Outpatient Medications   Medication Sig Dispense Refill    acetaminophen (TYLENOL) 500 MG tablet Take 1,000 mg by mouth every 6 hours as needed for Pain      sucralfate (CARAFATE) 1 GM tablet Take 1 tablet by mouth 4 times daily for 14 days Dissolve in water and drink. 56 tablet 0    omeprazole (PRILOSEC) 40 MG delayed release capsule Take 1 capsule by mouth every morning (before breakfast) 90 capsule 0    ondansetron (ZOFRAN) 4 MG tablet Take 1 tablet by mouth 3 times daily as needed for Nausea or Vomiting 30 tablet 0    methIMAzole (TAPAZOLE) 10 MG tablet Take 10 mg by mouth Five times weekly Monday- Friday only, Takes at night       No current facility-administered medications for this visit. ALLERGIES:    No Known Allergies    SH:  Social History     Tobacco Use    Smoking status: Never    Smokeless tobacco: Never   Vaping Use    Vaping Use: Never used   Substance Use Topics    Alcohol use: Not Currently    Drug use: Not Currently       FH:  Family History   Problem Relation Age of Onset    Diabetes Maternal Grandmother        Review of systems:  The patient has no difficulty with chest pain or shortness of breath. No fever or chills. Comprehensive 13 point review of systems was otherwise unremarkable except as noted above. Physical Exam:     BP (!) 140/85   Pulse 79   Ht 5' 7\" (1.702 m)   Wt 250 lb (113.4 kg)   BMI 39.16 kg/m²     General:  Well-developed, well-nourished, no distress. Psych:  Cooperative, good insight and judgement. Neuro:  Alert, oriented to person, place and time. Lungs:  Unlabored breathing. Symmetrical chest expansion. Heart:  Regular rate and rhythm.    Abdomen:  Soft, non-tender, non-distended. No guarding or rebound. Lap incisions are healing well. Labs: All labs reviewed today. Imaging: All imaging reviewed today. Diagnosis Orders   1. Morbid obesity (HCC)        2. Obesity, Class II, BMI 35-39.9        3. Hx of gastroesophageal reflux (GERD)        4. Hyperthyroidism        5. Graves disease        6. Dietary counseling        7. Exercise counseling            Assessment/Plan:    Elizabeth Yee is a 32 y.o. female here to follow up s/p robotic assisted sleeve gastrectomy    She is doing well without any major concerns. She is adhering to the diet and we discussed and addressed all her questions. She will advance to a pureed / smooth diet. I encouraged her to continue physical activity and aim for 300 minutes a week and include 2 strength session a week to maintain the weight loss. She will continue recommended vitamin/mineral supplementation and PPI until the prescription is complete. Return to the office in 2 weeks with myself. George Reich PA-C  12/5/2022    Counseling time:counseling time more than 50% of visit: 20 minutes : I spent this time preparing to see patient (including chart review and preparation), obtaining and/or reviewing additional medical history, performing a physical exam and evaluation, documenting clinical information in the electronic health record, independently interpreting results, communicating results to patient, family or caregiver, and/or coordinating care.

## 2022-12-05 ENCOUNTER — OFFICE VISIT (OUTPATIENT)
Dept: SURGERY | Age: 27
End: 2022-12-05

## 2022-12-05 VITALS
SYSTOLIC BLOOD PRESSURE: 140 MMHG | HEIGHT: 67 IN | BODY MASS INDEX: 39.24 KG/M2 | WEIGHT: 250 LBS | DIASTOLIC BLOOD PRESSURE: 85 MMHG | HEART RATE: 79 BPM

## 2022-12-05 DIAGNOSIS — E66.9 OBESITY, CLASS II, BMI 35-39.9: ICD-10-CM

## 2022-12-05 DIAGNOSIS — E05.90 HYPERTHYROIDISM: ICD-10-CM

## 2022-12-05 DIAGNOSIS — Z71.3 DIETARY COUNSELING: ICD-10-CM

## 2022-12-05 DIAGNOSIS — E05.00 GRAVES DISEASE: ICD-10-CM

## 2022-12-05 DIAGNOSIS — Z87.19 HX OF GASTROESOPHAGEAL REFLUX (GERD): ICD-10-CM

## 2022-12-05 DIAGNOSIS — E66.01 MORBID OBESITY (HCC): Primary | ICD-10-CM

## 2022-12-05 DIAGNOSIS — Z71.82 EXERCISE COUNSELING: ICD-10-CM

## 2022-12-05 PROCEDURE — 99024 POSTOP FOLLOW-UP VISIT: CPT | Performed by: PHYSICIAN ASSISTANT

## 2022-12-05 NOTE — PROGRESS NOTES
Malvin Galo MS, RD, LD  Surgical Weight Loss Dietitian  [address] [place] [address], [address] [place] [address]  Juliann Cadena  Phone [phone]   Fax [phone]    [facility] NUTRITION REASSESSMENT  Anthropometrics:    Ht: 57, wt: 250#, 157% IBW, 39.16 BMI  Pt has lost 7 lbs since last visit. Assessment:  Pt is 1 weeks s/p VSG. Pt is doing well with full liquid diet compliance. Intake of ~60g protein/d and ~64oz fluid/d. Pt is eating and drinking soups, water, gatorade zero, broth, sf yogurt, sf popsicles, protein shake, sf pudding. Pt is exercising via walking for 10-15 minutes 7x week. Pt is taking MVI and Ca supplements as recommended. Nutrition Diagnosis:   Altered GI function R/T wt loss surgery as evidenced by s/p VSG. Class II obesity R/T excessive energy intake as evidenced by BMI = 39.16 and 157 % IBW.  --ongoing, modified--BMI and %IBW adjusted to reflect weight loss--     Intervention:   Discussed food choices and meal ideas on pureed/smooth diet, once released by surgeon. Encouraged pt to follow mindful eating behaviors, lean protein focus followed by non-starchy vegetables as able. Encouraged pt use protein supplements throughout the day as snacks rather than as meal replacement. Encouraged continued and increased activity. Pt goal of walking and PT exercises for 30 mins 5x week. Monitoring and Evaluation:  Monitor for continued safe, supervised weight loss for VSG. Monitor pt for meeting protein requirements of 60-80g/d, 64+ fl oz fluids. Follow for tolerance of pureed/smooth diet. Follow for increased exercise.   F/U per MD Malvin Galo, MS, RD, LD

## 2022-12-18 NOTE — PROGRESS NOTES
Ratna Yoder PA-C  Bariatric & Advanced Laparoscopic Surgery & Endoscopy  [address] [place] [address], [address] [facility]  Juliann Cadena  Phone [phone]   Fax [phone]    Date of visit: 2022          Name: Cory Garland      MRN: [id number]       : 1995       Age: 32 y.o. Sex: female        PCP: NOT ON FILE, MD     Surgeon: Dr. Ileana Morris  Procedure: robotic assisted sleeve gastrectomy    HPI:    3 weeks post-op visit after a robotic assisted sleeve gastrectomy was done on 2022. She has lost 8lbs since her last office visit. Weight History Graph  Post-Surgical Weight Loss  Date: 22  Height: 5' 7\" (170.2 cm)  Weight: 242 lb (109.8 kg)  BMI: 37.90  Weight Change: -8 lbs  Total Weight Change: -15 lbs  % EBWL: 15%    Evaluation of Pre-operative Co-morbid Conditions:    None    Clinical Assessment and Physical Exam:    She is doing very well today and is feeling good. She reports that she has been adhering to the pureed / smooth diet without difficulty. She admits to rare right sided abdominal pain which is improving, and occasional vomiting with over eating. She denies any nausea or heartburn. She denies fevers or chills, or any redness or drainage from the incision sites. She does report having occasional problems with constipation for which she takes Colace. Her pain is well controlled and is not taking pain medications. She has been taking the PPI without difficulty. She has been doing her protein shakes without difficulty. Protein:  50-60 grams per day  Fluids:    60+ ounces per day  Exercise:  walking 5x per week for 30 minutes  Denies reflux. Denies dysphagia. Tolerating Protein first diet without difficulty.     PMH:  Past Medical History:   Diagnosis Date    Graves disease        PSH:  Past Surgical History:   Procedure Laterality Date    MEDIAL COLLATERAL LIGAMENT REPAIR, KNEE Right 2007    PATELLAR TENDON REPAIR Right  SLEEVE GASTRECTOMY N/A 11/28/2022    ERAS/ GASTRECTOMY SLEEVE LAPAROSCOPIC ROBOTIC performed by Maria Eugenia James MD at [address] N/A 11/28/2022    EGD ESOPHAGOGASTRODUODENOSCOPY performed by [facility]enia [facility] MD [facility]:  Current Outpatient Medications   Medication Sig Dispense Refill    acetaminophen (TYLENOL) 500 MG tablet Take 1,000 mg by mouth every 6 hours as needed for Pain      sucralfate (CARAFATE) 1 GM tablet Take 1 tablet by mouth 4 times daily for 14 days Dissolve in water and drink. 56 tablet 0    omeprazole (PRILOSEC) 40 MG delayed release capsule Take 1 capsule by mouth every morning (before breakfast) 90 capsule 0    ondansetron (ZOFRAN) 4 MG tablet Take 1 tablet by mouth 3 times daily as needed for Nausea or Vomiting 30 tablet 0    methIMAzole (TAPAZOLE) 10 MG tablet Take 10 mg by mouth Five times weekly Monday- Friday only, Takes at night       No current facility-administered medications for this visit. ALLERGIES:    No Known Allergies    SH:  Social History     Tobacco Use    Smoking status: Never    Smokeless tobacco: Never   Vaping Use    Vaping Use: Never used   Substance Use Topics    Alcohol use: Not Currently    Drug use: Not Currently       FH:  Family History   Problem Relation Age of Onset    Diabetes Maternal Grandmother        Review of systems:  The patient has no difficulty with chest pain or shortness of breath. No fever or chills. Comprehensive 13 point review of systems was otherwise unremarkable except as noted above. Physical Exam:     BP (!) 141/74   Pulse 77   Ht 5' 7\" (1.702 m)   Wt 242 lb (109.8 kg)   BMI 37.90 kg/m²     General:  Well-developed, well-nourished, no distress. Psych:  Cooperative, good insight and judgement. Neuro:  Alert, oriented to person, place and time. Lungs:  Unlabored breathing. Symmetrical chest expansion. Heart:  Regular rate and rhythm.    Abdomen:  Soft, non-tender, non-distended. No guarding or rebound. Lap incisions are healing well. Labs: All labs reviewed today. Imaging: All imaging reviewed today. Diagnosis Orders   1. Morbid obesity (HCC)        2. Obesity, Class II, BMI 35-39.9        3. S/P laparoscopic sleeve gastrectomy        4. Dietary counseling        5. Exercise counseling        6. Right sided abdominal pain        7. Vomiting without nausea, unspecified vomiting type        8. Other constipation            Assessment/Plan:    Prince Medrano is a 32 y.o. female here to follow up s/p robotic assisted sleeve gastrectomy    She is doing well without any major concerns. She is adhering to the diet and we discussed and addressed all her questions. She will advance to a soft diet. I encouraged her to continue physical activity and aim for 300 minutes a week and include 2 strength session a week to maintain the weight loss. She will continue recommended vitamin/mineral supplementation and PPI until the prescription is complete. Return to the office in 3 weeks with myself. Corina Randhawa PA-C  12/19/2022    Counseling time:counseling time more than 50% of visit: 20 minutes: I spent this time preparing to see patient (including chart review and preparation), obtaining and/or reviewing additional medical history, performing a physical exam and evaluation, documenting clinical information in the electronic health record, independently interpreting results, communicating results to patient, family or caregiver, and/or coordinating care.

## 2022-12-19 ENCOUNTER — OFFICE VISIT (OUTPATIENT)
Dept: SURGERY | Age: 27
End: 2022-12-19

## 2022-12-19 VITALS
HEART RATE: 77 BPM | HEIGHT: 67 IN | DIASTOLIC BLOOD PRESSURE: 74 MMHG | SYSTOLIC BLOOD PRESSURE: 141 MMHG | WEIGHT: 242 LBS | BODY MASS INDEX: 37.98 KG/M2

## 2022-12-19 DIAGNOSIS — Z71.82 EXERCISE COUNSELING: ICD-10-CM

## 2022-12-19 DIAGNOSIS — E66.01 MORBID OBESITY (HCC): Primary | ICD-10-CM

## 2022-12-19 DIAGNOSIS — R11.11 VOMITING WITHOUT NAUSEA, UNSPECIFIED VOMITING TYPE: ICD-10-CM

## 2022-12-19 DIAGNOSIS — K59.09 OTHER CONSTIPATION: ICD-10-CM

## 2022-12-19 DIAGNOSIS — Z98.84 S/P LAPAROSCOPIC SLEEVE GASTRECTOMY: ICD-10-CM

## 2022-12-19 DIAGNOSIS — R10.9 RIGHT SIDED ABDOMINAL PAIN: ICD-10-CM

## 2022-12-19 DIAGNOSIS — Z71.3 DIETARY COUNSELING: ICD-10-CM

## 2022-12-19 DIAGNOSIS — E66.9 OBESITY, CLASS II, BMI 35-39.9: ICD-10-CM

## 2022-12-19 PROCEDURE — 99024 POSTOP FOLLOW-UP VISIT: CPT | Performed by: PHYSICIAN ASSISTANT

## 2022-12-19 NOTE — PROGRESS NOTES
Kathy Crowder MS, RD, LD  Surgical Weight Loss Dietitian  [address] [place] [address]  Juliann Cadena  Phone [phone]   Fax [phone]    [facility] NUTRITION REASSESSMENT  Anthropometrics:    Ht: 57, wt: 242#, 152% IBW, 37.90 BMI  Pt has lost 8 lbs since last visit. Assessment:  Pt is 3 weeks s/p VSG. Pt is doing well with pureed/smooth diet compliance. Intake of ~50-60g protein/d and ~50-60+oz fluid/d. Pt is waiting 30 minutes after eating to drink liquids. Pt is eating turkey meatballs, scrambled eggs, cottage cheese, imitation crab sticks, veggie tots, smoked salmon, yogurt, chicken salad, buffalo chicken dip; and drinking water, gatorade zero. Pt is exercising via walking for 30 minutes 5x week. Pt is taking MVI and Ca supplements as recommended. Nutrition Diagnosis:   Altered GI function R/T wt loss surgery as evidenced by s/p VSG. Class II obesity R/T excessive energy intake as evidenced by BMI = 37.90 and 152 % IBW.  --ongoing, modified--BMI and %IBW adjusted to reflect weight loss--     Intervention:   Discussed food choices and meal ideas on Soft diet, once released by surgeon. Encouraged pt to follow mindful eating behaviors, lean protein focus followed by non-starchy vegetables as able. Encouraged pt use protein supplements throughout the day as snacks rather than as meal replacement. Encouraged continued and increased activity. Monitoring and Evaluation:  Monitor for continued safe, supervised weight loss for VSG. Monitor pt for meeting requirements of 60-80g/d protein, 64+ fl oz fluids. Follow for tolerance of Soft diet. Follow for increased activity.   F/U per MD Kathy Crowder MS, RD, LD

## 2023-01-19 NOTE — PROGRESS NOTES
Yaneli Guy PA-C  Bariatric & Advanced Laparoscopic Surgery & Endoscopy  [address] [place] [address], [address] [facility], [facility]  Phone [phone]   Fax [phone]    Date of visit: 2023          Name: Moi Lord      MRN: [id number]       : 1995       Age: 32 y.o. Sex: female        PCP: NOT ON FILE, MD     Surgeon: Dr. Tavarez Cea  Procedure: robotic assisted sleeve gastrectomy    HPI:    7 weeks post-op visit after a robotic assisted sleeve gastrectomy was done on 2022. She has lost 10 lbs since her last office visit. Weight History Graph  Post-Surgical Weight Loss  Date: 23  Height: 5' 7\" (170.2 cm)  Weight: 232 lb (105.2 kg)  BMI: 36.33  Weight Change: -10 lbs  Total Weight Change: -25 lbs  % EBWL: 26%    Evaluation of Pre-operative Co-morbid Conditions:    None    Clinical Assessment and Physical Exam:    She is doing very well today and is feeling good. She reports that she has been adhering to the soft diet without difficulty. She denies any abdominal pain, nausea or vomiting or heartburn. She denies fevers or chills, or any redness or drainage from the incision sites. She does not report having problems with constipation. Her pain is well controlled and is not taking pain medications. She has been taking the PPI without difficulty. She has been doing her protein shakes without difficulty. Protein:  65-80 grams per day  Fluids:    72 ounces per day  Exercise:  walking, weight lifting 4x per week for 45 minutes  Denies reflux. Denies dysphagia. Tolerating Protein first diet without difficulty.     PMH:  Past Medical History:   Diagnosis Date    Graves disease        PSH:  Past Surgical History:   Procedure Laterality Date    MEDIAL COLLATERAL LIGAMENT REPAIR, KNEE Right 2007    PATELLAR TENDON REPAIR Right 2020    SLEEVE GASTRECTOMY N/A 2022    ERAS/ GASTRECTOMY SLEEVE LAPAROSCOPIC ROBOTIC performed by Nallely Yoo Katherine Reyna MD at [address] [facility]COPY N/A 11/28/2022    EGD ESOPHAGOGASTRODUODENOSCOPY performed by Harjit Lind MD at [address] [facility]:  Current Outpatient Medications   Medication Sig Dispense Refill    acetaminophen (TYLENOL) 500 MG tablet Take 1,000 mg by mouth every 6 hours as needed for Pain      sucralfate (CARAFATE) 1 GM tablet Take 1 tablet by mouth 4 times daily for 14 days Dissolve in water and drink. 56 tablet 0    omeprazole (PRILOSEC) 40 MG delayed release capsule Take 1 capsule by mouth every morning (before breakfast) 90 capsule 0    ondansetron (ZOFRAN) 4 MG tablet Take 1 tablet by mouth 3 times daily as needed for Nausea or Vomiting 30 tablet 0    methIMAzole (TAPAZOLE) 10 MG tablet Take 10 mg by mouth Five times weekly Monday- Friday only, Takes at night       No current facility-administered medications for this visit. ALLERGIES:    No Known Allergies    SH:  Social History     Tobacco Use    Smoking status: Never    Smokeless tobacco: Never   Vaping Use    Vaping Use: Never used   Substance Use Topics    Alcohol use: Not Currently    Drug use: Not Currently       FH:  Family History   Problem Relation Age of Onset    Diabetes Maternal Grandmother        Review of systems:  The patient has no difficulty with chest pain or shortness of breath. No fever or chills. Comprehensive 13 point review of systems was otherwise unremarkable except as noted above. Physical Exam:     /83   Pulse 86   Ht 5' 7\" (1.702 m)   Wt 232 lb (105.2 kg)   BMI 36.34 kg/m²     General:  Well-developed, well-nourished, no distress. Psych:  Cooperative, good insight and judgement. Neuro:  Alert, oriented to person, place and time. Lungs:  Unlabored breathing. Symmetrical chest expansion. Heart:  Regular rate and rhythm. Abdomen:  Soft, non-tender, non-distended. No guarding or rebound. Lap incisions are healing well. Labs:   All labs reviewed today.    Imaging: All imaging reviewed today. Diagnosis Orders   1. Morbid obesity (HCC)        2. Obesity, Class II, BMI 35-39.9        3. S/P laparoscopic sleeve gastrectomy        4. Dietary counseling        5. Exercise counseling            Assessment/Plan:    Jasmine Santizo is a 32 y.o. female here to follow up s/p robotic assisted sleeve gastrectomy    She is doing well without any major concerns. She is adhering to the diet and we discussed and addressed all her questions. She will advance to a regular diet. I encouraged her to continue physical activity and aim for 300 minutes a week and include 2 strength session a week to maintain the weight loss. She will continue recommended vitamin/mineral supplementation and PPI until the prescription is complete. Return to the office in 6 weeks with Dr. Adelso Yang. Cesar Ferguson PA-C  1/20/2023    Counseling time:counseling time more than 50% of visit: 20 minutes: I spent this time preparing to see patient (including chart review and preparation), obtaining and/or reviewing additional medical history, performing a physical exam and evaluation, documenting clinical information in the electronic health record, independently interpreting results, communicating results to patient, family or caregiver, and/or coordinating care.

## 2023-01-20 ENCOUNTER — OFFICE VISIT (OUTPATIENT)
Dept: SURGERY | Age: 28
End: 2023-01-20

## 2023-01-20 VITALS
BODY MASS INDEX: 36.41 KG/M2 | WEIGHT: 232 LBS | HEART RATE: 86 BPM | HEIGHT: 67 IN | DIASTOLIC BLOOD PRESSURE: 83 MMHG | SYSTOLIC BLOOD PRESSURE: 135 MMHG

## 2023-01-20 DIAGNOSIS — Z71.3 DIETARY COUNSELING: ICD-10-CM

## 2023-01-20 DIAGNOSIS — E66.9 OBESITY, CLASS II, BMI 35-39.9: ICD-10-CM

## 2023-01-20 DIAGNOSIS — Z98.84 S/P LAPAROSCOPIC SLEEVE GASTRECTOMY: ICD-10-CM

## 2023-01-20 DIAGNOSIS — Z71.82 EXERCISE COUNSELING: ICD-10-CM

## 2023-01-20 DIAGNOSIS — E66.01 MORBID OBESITY (HCC): Primary | ICD-10-CM

## 2023-01-20 PROCEDURE — 99024 POSTOP FOLLOW-UP VISIT: CPT | Performed by: PHYSICIAN ASSISTANT

## (undated) DEVICE — VESSEL SEALER EXTEND: Brand: ENDOWRIST

## (undated) DEVICE — ADHESIVE SKIN CLSR 0.7ML TOP DERMBND ADV

## (undated) DEVICE — BINDER ABD M/L H9IN FOR 46-62IN WHT 3 SLD PNL DSGN HOOP AND

## (undated) DEVICE — STAPLER 60 RELOAD WHITE: Brand: SUREFORM

## (undated) DEVICE — INTENDED FOR TISSUE SEPARATION, AND OTHER PROCEDURES THAT REQUIRE A SHARP SURGICAL BLADE TO PUNCTURE OR CUT.: Brand: BARD-PARKER ® STAINLESS STEEL BLADES

## (undated) DEVICE — INSUFFLATION NEEDLE TO ESTABLISH PNEUMOPERITONEUM.: Brand: INSUFFLATION NEEDLE

## (undated) DEVICE — COLUMN DRAPE

## (undated) DEVICE — STAPLER 60 RELOAD BLUE: Brand: SUREFORM

## (undated) DEVICE — GLOVE SURG SZ 65 THK91MIL LTX FREE SYN POLYISOPRENE

## (undated) DEVICE — CANISTER, RIGID, 2000CC: Brand: MEDLINE INDUSTRIES, INC.

## (undated) DEVICE — PAD PT POS 36 IN SURGYPAD DISP

## (undated) DEVICE — ARM DRAPE

## (undated) DEVICE — SUTURE MCRYL SZ 4-0 L27IN ABSRB UD L19MM PS-2 1/2 CIR PRIM Y426H

## (undated) DEVICE — STAPLER 60: Brand: SUREFORM

## (undated) DEVICE — STAPLER 60 RELOAD GREEN: Brand: SUREFORM

## (undated) DEVICE — BLADELESS OBTURATOR: Brand: WECK VISTA

## (undated) DEVICE — CANNULA SEAL

## (undated) DEVICE — AIRSEAL 8 MM CANNULA CAP AND OBTURATOR WITH BLADELESS OPTICAL TIP COMPATIBLE WITH INTUITIVE DA VINCI XI AND DA VINCI X 8 MM INSTRUMENT CANNULA, STANDARD LENGTH: Brand: AIRSEAL

## (undated) DEVICE — SOLUTION IRRIG 3000ML 0.9% SOD CHL USP UROMATIC PLAS CONT

## (undated) DEVICE — ROBOTIC HYSTERECTOMY: Brand: MEDLINE INDUSTRIES, INC.

## (undated) DEVICE — AIRSEAL BIFURCATED FILTERED TUBESET WITH ACTIVATED CHARCOAL FILTER: Brand: AIRSEAL

## (undated) DEVICE — REDUCER: Brand: ENDOWRIST

## (undated) DEVICE — GLOVE SURG SZ 7 L12IN FNGR THK79MIL GRN LTX FREE

## (undated) DEVICE — SEAL

## (undated) DEVICE — APPLICATOR MEDICATED 26 CC SOLUTION HI LT ORNG CHLORAPREP